# Patient Record
Sex: FEMALE | Race: WHITE
[De-identification: names, ages, dates, MRNs, and addresses within clinical notes are randomized per-mention and may not be internally consistent; named-entity substitution may affect disease eponyms.]

---

## 2017-02-14 ENCOUNTER — HOSPITAL ENCOUNTER (EMERGENCY)
Dept: HOSPITAL 59 - ER | Age: 77
Discharge: HOME | End: 2017-02-14
Payer: MEDICARE

## 2017-02-14 DIAGNOSIS — Z87.891: ICD-10-CM

## 2017-02-14 DIAGNOSIS — I10: Primary | ICD-10-CM

## 2017-02-14 DIAGNOSIS — R42: ICD-10-CM

## 2017-02-14 DIAGNOSIS — I25.2: ICD-10-CM

## 2017-02-14 LAB
ANION GAP SERPL CALC-SCNC: 13.5 MMOL/L (ref 7–16)
BASOPHILS NFR BLD: 0.7 % (ref 0–6)
BUN SERPL-MCNC: 18 MG/DL (ref 7–17)
CO2 SERPL-SCNC: 25.5 MMOL/L (ref 22–30)
CREAT SERPL-MCNC: 0.7 MG/DL (ref 0.52–1.04)
EOSINOPHIL NFR BLD: 2.8 % (ref 0–6)
ERYTHROCYTE [DISTWIDTH] IN BLOOD BY AUTOMATED COUNT: 12.3 % (ref 11.5–14.5)
EST GLOMERULAR FILTRATION RATE: > 60 ML/MIN
GLUCOSE SERPL-MCNC: 96 MG/DL (ref 70–110)
GRANULOCYTES NFR BLD: 55.6 % (ref 47–80)
HCT VFR BLD CALC: 40.9 % (ref 35–47)
HGB BLD-MCNC: 14.2 GM/DL (ref 11.6–16)
LYMPHOCYTES NFR BLD AUTO: 31.2 % (ref 16–45)
MCH RBC QN AUTO: 31.8 PG (ref 27–33)
MCHC RBC AUTO-ENTMCNC: 34.7 G/DL (ref 32–36)
MCV RBC AUTO: 91.7 FL (ref 81–97)
MONOCYTES NFR BLD: 9.7 % (ref 0–9)
PLATELET # BLD: 255 K/UL (ref 130–400)
PMV BLD AUTO: 9.8 FL (ref 7.4–10.4)
RBC # BLD AUTO: 4.46 M/UL (ref 3.8–5.4)
WBC # BLD AUTO: 7.1 K/UL (ref 4.2–12.2)

## 2017-02-14 PROCEDURE — 80048 BASIC METABOLIC PNL TOTAL CA: CPT

## 2017-02-14 PROCEDURE — 99284 EMERGENCY DEPT VISIT MOD MDM: CPT

## 2017-02-14 PROCEDURE — 84484 ASSAY OF TROPONIN QUANT: CPT

## 2017-02-14 PROCEDURE — 93010 ELECTROCARDIOGRAM REPORT: CPT

## 2017-02-14 PROCEDURE — 85025 COMPLETE CBC W/AUTO DIFF WBC: CPT

## 2017-02-14 PROCEDURE — 93005 ELECTROCARDIOGRAM TRACING: CPT

## 2017-02-14 NOTE — EMERGENCY DEPARTMENT RECORD
History of Present Illness





- General


Chief Complaint: Hypertension


Stated Complaint: HYPERTENTION


Time Seen by Provider: 17 18:15


Source: Patient


Mode of Arrival: Ambulatory


Limitations: No limitations





- History of Present Illness


Initial Comments: 


pt was home when a friend checked her bp and found it to be 155/105.  they 

became alarmed at this high presure, they tell me, and called her drs office.  

her doctors office told her to go to the er.  she said also she then became 

light headed and thought she was going to pass out. she said she has been in 

chronic pain for a year. she was also concerned that her hr was 61.


MD Complaint: Dizziness, Lightheadedness


Onset/Timin


-: Hour(s)


Timing: Gradual onset


Description: Lightheadedness


History of Same: Yes


History of Trauma: No


Improves With: Nothing





- Henny Coma Scale


Eye Response: (4) Open spontaneously


Motor Response: (6) Obeys commands


Verbal Response: (5) Oriented


[place] Total: 15





- Symptoms of Stroke


Onset of Symptoms Date: 17


Onset of Symptoms Time: 10:00





- Related Data


 Home Medications











 Medication  Instructions  Recorded  Confirmed  Last Taken


 


Aspirin [Aspirin EC] 81 mg PO DAILY 11/10/14 02/14/17 1 Day Ago


 


Metoprolol/Hydrochlorothiazide 1 each PO DAILY 11/10/14 02/14/17 1 Day Ago





[Metoprolol-Hctz 100-50 mg Tab]    


 


Multivitamin [Multi-Vitamin Daily] 1 tab PO DAILY 11/10/14 02/14/17 1 Day Ago


 


Naproxen [Naprosyn] 500 mg PO BID 16 1 Day Ago


 


Omeprazole [Prilosec] 20 mg PO DAILY 17 1 Day Ago











 Allergies











Allergy/AdvReac Type Severity Reaction Status Date / Time


 


tramadol Allergy Severe ITCHING Verified 17 17:35


 


amoxicillin trihydrate Allergy Unknown PT UNSURE Verified 17 17:35





[From AMOXIL]   OF REACTION  


 


azithromycin [From ZITHROMAX] Allergy Unknown PT UNSURE Verified 17 17:35





   OF REACTION  


 


ciprofloxacin [From CIPRO] Allergy Unknown PT UNSURE Verified 17 17:35





   OF REACTION  


 


ciprofloxacin HCl Allergy Unknown PT UNSURE Verified 17 17:35





[From CIPRO]   OF REACTION  


 


clarithromycin Allergy Unknown PT UNSURE Verified 17 17:35





[CLARITHROMYCIN]   OF REACTION  


 


codeine [CODEINE] Allergy Unknown PT UNSURE Verified 17 17:35





   OF REACTION  


 


prednisone [PREDNISONE] Allergy Unknown PT Verified 17 17:35





   ALLERGIC  





   TO STEROIDS  


 


Sulfa (Sulfonamide Allergy Unknown ANAPHYLAXIS Verified 17 17:35





Antibiotics)     














Travel Screening





- Travel/Exposure Within Last 30 Days


Have you traveled within the last 30 days?: No





- Travel/Exposure Within Last Year


Have you traveled outside the [place] in the last year?: No





- Additonal Travel Details


Have you been exposed to anyone with a communicable illness?: No





- Travel Symptoms


Symptom Screening: None





Review of Systems


Reviewed: No additional complaints except as noted below


Constitutional: Reports: As per HPI.  Denies: Chills, Fever, Malaise, Night 

sweats, Weakness, Weight change


Eyes: Reports: As per HPI.  Denies: Eye discharge, Eye pain, Photophobia, 

Vision change


ENT: Reports: As per HPI.  Denies: Congestion, Dental pain, Ear pain, Epistaxis

, Hearing loss, Throat pain


Respiratory: Reports: As per HPI.  Denies: Cough, Dyspnea, Hemoptysis, Stridor, 

Wheezes


Cardiovascular: Reports: As per HPI.  Denies: Arrhythmia, Chest pain, Dyspnea 

on exertion, Edema, Murmurs, Orthopnea, Palpitations, Paroxysmal nocturnal 

dyspnea, Rheumatic Fever, Syncope


Endocrine: Reports: As per HPI.  Denies: Fatigue, Heat or cold intolerance, 

Polydipsia, Polyuria


Gastrointestinal: Reports: As per HPI.  Denies: Abdominal pain, Constipation, 

Diarrhea, Hematemesis, Hematochezia, Melena, Nausea, Vomiting


Genitourinary: Reports: As per HPI.  Denies: Abnormal menses, Discharge, 

Dyspareunia, Dysuria, Frequency, Hematuria, Incontinence, Retention, Urgency


Musculoskeletal: Reports: As per HPI.  Denies: Arthralgia, Back pain, Gout, 

Joint swelling, Myalgia, Neck pain


Skin: Reports: As per HPI.  Denies: Bruising, Change in color, Change in hair/

nails, Lesions, Pruritus, Rash


Neurological: Reports: As per HPI.  Denies: Abnormal gait, Confusion, Headache, 

Numbness, Paresthesias, Seizure, Tingling, Tremors, Vertigo, Weakness


Psychiatric: Reports: As per HPI.  Denies: Anxiety, Auditory hallucinations, 

Depression, Homicidal thoughts, Suicidal thoughts, Visual hallucinations


Hematological/Lymphatic: Reports: As per HPI.  Denies: Anemia, Blood Clots, 

Easy bleeding, Easy bruising, Swollen glands





Past Medical History





- SOCIAL HISTORY


Smoking Status: Former smoker


Alcohol Use: None


Drug Use: None





- RESPIRATORY


Hx Respiratory Disorders: Yes


Hx Pneumonia: Yes





- CARDIOVASCULAR


Hx Cardio Disorders: Yes


Hx Heart Attack: Yes


Hx Hypertension: Yes





- NEURO


Hx Neuro Disorders: Yes


Hx CVA: Yes


Hx TIA: Yes





- GI


Hx GI Disorders: Yes


Hx Reflux: Yes (was on nexium)





- 


Hx Genitourinary Disorders: No





- ENDOCRINE


Hx Endocrine Disorders: No


Hx Diabetes: No


Hx Thyroid Disease: No





- MUSCULOSKELETAL


Hx Musculoskeletal Disorders: Yes


Hx Arthritis: Yes


Hx Osteoporosis: Yes





- PSYCH


Hx Psych Problems: No





- HEMATOLOGY/ONCOLOGY


Hx Hematology/Oncology Disorders: Yes


Hx Cancer: Yes (uterine)





Family Medical History


Any Significant Family History?: Yes


Hx Alcohol Use: Father


Hx Cancer: Brother/Sister


Hx Diabetes: Brother/Sister


Hx Heart Disease: Father, Brother/Sister


Hx Liver Disease: Father


Hx Resp Disorders: Father, Mother, Brother/Sister





Physical Exam





- General


General Appearance: Alert, Oriented x3, Cooperative, Mild distress





- Head


Head exam: Normal inspection





- Eye


Eye exam: Normal appearance, PERRL, EOMI


Pupils: Normal accommodation





- ENT


ENT exam: Normal exam, Mucous membranes moist, Normal external ear exam, Normal 

orophraynx


Ear exam: Normal external inspection.  negative: External canal tenderness


Nasal Exam: Normal inspection.  negative: Discharge, Sinus tenderness


Mouth exam: Normal external inspection, Tongue normal


Teeth exam: Normal inspection.  negative: Dental caries


Throat exam: Normal inspection.  negative: Tonsillar erythema, Tonsillar exudate





- Neck


Neck exam: Normal inspection, Full ROM.  negative: Tenderness





- Respiratory


Respiratory exam: Normal lung sounds bilaterally.  negative: Respiratory 

distress





- Cardiovascular


Cardiovascular Exam: Regular rate, Normal rhythm, Normal heart sounds





- GI/Abdominal


GI/Abdominal exam: Soft, Normal bowel sounds.  negative: Tenderness





- Rectal


Rectal exam: Deferred





- 


 exam: Deferred





- Extremities


Extremities exam: Normal inspection, Full ROM, Normal capillary refill.  

negative: Tenderness





- Back


Back exam: Reports: Normal inspection, Full ROM.  Denies: Muscle spasm, Rash 

noted, Tenderness





- Neurological


Neurological exam: Alert, CN II-XII intact, Normal gait, Oriented X3





- Psychiatric


Psychiatric exam: Normal affect, Normal mood





- Skin


Skin exam: Dry, Intact, Normal color, Warm





Course





 Vital Signs











  17





  17:27


 


Temperature 98.3 F


 


Pulse Rate 60


 


Respiratory 18





Rate 


 


Blood Pressure 167/95


 


Pulse Ox 16 L














Medical Decision Making





- Management Options


MDM Management: Additional Work-up Planned (e.g. ADM/Transfer/OP Study)





- Data Complexity


MDM Data: Labs Ordered and/or Reviewed, X-Ray Ordered and/or Reviewed, EKG 

Ordered and/or Reviewed





- Lab Data


Result diagrams: 


 17 17:50





 17 17:50





 Lab Results











  17 Range/Units





  17:50 17:50 


 


WBC  7.1   (4.2-12.2)  K/uL


 


RBC  4.46   (3.80-5.40)  M/uL


 


Hgb  14.2   (11.6-16.0)  gm/dl


 


Hct  40.9   (35.0-47.0)  %


 


MCV  91.7   (81-97)  fl


 


MCH  31.8   (27-33)  pg


 


MCHC  34.7   (32-36)  g/dl


 


RDW  12.3   (11.5-14.5)  %


 


Plt Count  255   (130-400)  K/uL


 


MPV  9.8   (7.4-10.4)  fl


 


Gran %  55.6   (47-80)  %


 


Lymphocytes %  31.2   (16-45)  %


 


Monocytes %  9.7 H   (0-9)  %


 


Eosinophils %  2.8   (0-6)  %


 


Basophils %  0.7   (0-6)  %


 


Sodium   140  (136-145)  mmol/L


 


Potassium   3.9  (3.5-5.1)  mmol/L


 


Chloride   101  ()  mmol/L


 


Carbon Dioxide   25.5  (22-30)  mmol/L


 


Anion Gap   13.5  (7-16)  


 


BUN   18 H  (7-17)  mg/dL


 


Creatinine   0.7  (0.52-1.04)  mg/dL


 


Estimated GFR   > 60  ml/min


 


Random Glucose   96  ()  mg/dL


 


Calcium   8.9  (8.5-10.1)  mg/dL














- EKG Data


-: EKG Interpreted by Me


EKG: Abnormal EKG (nonspecific t wave inversions)





- Radiology Data


Radiology results: Report reviewed, Image reviewed





Disposition


Disposition: Discharge


Clinical Impression: 


Hypertension


Qualifiers:


 Hypertension type: essential hypertension Qualified Code(s): I10 - Essential (

primary) hypertension


Disposition: Home, Self-Care


Instructions:  Hypertension (ED)


Additional Instructions: 


recheck blood pressure tomorrow. return sooner if worse.  follow up with family 

doctor.  push fluids


Forms:  Patient Portal Access

## 2017-05-09 ENCOUNTER — HOSPITAL ENCOUNTER (EMERGENCY)
Dept: HOSPITAL 59 - ER | Age: 77
Discharge: HOME | End: 2017-05-09
Payer: MEDICARE

## 2017-05-09 DIAGNOSIS — F17.210: ICD-10-CM

## 2017-05-09 DIAGNOSIS — I25.2: ICD-10-CM

## 2017-05-09 DIAGNOSIS — R11.2: ICD-10-CM

## 2017-05-09 DIAGNOSIS — R19.7: Primary | ICD-10-CM

## 2017-05-09 DIAGNOSIS — R10.13: ICD-10-CM

## 2017-05-09 DIAGNOSIS — I10: ICD-10-CM

## 2017-05-09 LAB
ALBUMIN SERPL-MCNC: 4.3 GM/DL (ref 3.5–5)
ALBUMIN/GLOB SERPL: 1.3 {RATIO} (ref 1.1–1.8)
ALP SERPL-CCNC: 100 U/L (ref 38–126)
ALT SERPL-CCNC: 38 U/L (ref 9–52)
ANION GAP SERPL CALC-SCNC: 11 MMOL/L (ref 7–16)
APPEARANCE UR: CLEAR
AST SERPL-CCNC: 41 U/L (ref 14–36)
BACTERIA #/AREA URNS HPF: (no result) /[HPF]
BASOPHILS NFR BLD: 0.4 % (ref 0–6)
BILIRUB SERPL-MCNC: 0.48 MG/DL (ref 0.2–1.3)
BILIRUB UR-MCNC: NEGATIVE MG/DL
BUN SERPL-MCNC: 13 MG/DL (ref 7–17)
CO2 SERPL-SCNC: 25 MMOL/L (ref 22–30)
COLOR UR: YELLOW
CREAT SERPL-MCNC: 0.6 MG/DL (ref 0.52–1.04)
EOSINOPHIL NFR BLD: 4.1 % (ref 0–6)
ERYTHROCYTE [DISTWIDTH] IN BLOOD BY AUTOMATED COUNT: 12.4 % (ref 11.5–14.5)
EST GLOMERULAR FILTRATION RATE: > 60 ML/MIN
GLOBULIN SER-MCNC: 3.2 GM/DL (ref 1.4–4.8)
GLUCOSE SERPL-MCNC: 108 MG/DL (ref 70–110)
GLUCOSE UR STRIP-MCNC: NEGATIVE MG/DL
GRANULOCYTES NFR BLD: 48.7 % (ref 47–80)
HCT VFR BLD CALC: 42.5 % (ref 35–47)
HGB BLD-MCNC: 14.7 GM/DL (ref 11.6–16)
KETONES UR QL STRIP: NEGATIVE
LIPASE SERPL-CCNC: 29 U/L (ref 23–300)
LYMPHOCYTES NFR BLD AUTO: 34 % (ref 16–45)
MCH RBC QN AUTO: 31.7 PG (ref 27–33)
MCHC RBC AUTO-ENTMCNC: 34.6 G/DL (ref 32–36)
MCV RBC AUTO: 91.8 FL (ref 81–97)
MOLECULAR C DIFF TOXIN SCREEN: NOT DETECTED
MONOCYTES NFR BLD: 12.8 % (ref 0–9)
NITRITE UR QL STRIP: NEGATIVE
PLATELET # BLD: 256 K/UL (ref 130–400)
PMV BLD AUTO: 9.7 FL (ref 7.4–10.4)
PROT SERPL-MCNC: 7.5 GM/DL (ref 6.3–8.2)
PROT UR QL STRIP: NEGATIVE
RBC # BLD AUTO: 4.63 M/UL (ref 3.8–5.4)
RBC # UR STRIP: (no result) /UL
ROTOVIRUS: NOT DETECTED
URINE LEUKOCYTE ESTERASE: (no result)
UROBILINOGEN UR STRIP-ACNC: 0.2 E.U./DL (ref 0.2–1)
WBC # BLD AUTO: 5.1 K/UL (ref 4.2–12.2)

## 2017-05-09 PROCEDURE — 99284 EMERGENCY DEPT VISIT MOD MDM: CPT

## 2017-05-09 PROCEDURE — 85025 COMPLETE CBC W/AUTO DIFF WBC: CPT

## 2017-05-09 PROCEDURE — 81001 URINALYSIS AUTO W/SCOPE: CPT

## 2017-05-09 PROCEDURE — 87493 C DIFF AMPLIFIED PROBE: CPT

## 2017-05-09 PROCEDURE — 87425 ROTAVIRUS AG IA: CPT

## 2017-05-09 PROCEDURE — 83690 ASSAY OF LIPASE: CPT

## 2017-05-09 PROCEDURE — 96374 THER/PROPH/DIAG INJ IV PUSH: CPT

## 2017-05-09 PROCEDURE — 80053 COMPREHEN METABOLIC PANEL: CPT

## 2017-05-09 NOTE — EMERGENCY DEPARTMENT RECORD
History of Present Illness





- General


Stated complaint: DIARRHEA/NAUSEATED


Time Seen by Provider: 17 18:30


Source: Patient


Mode of Arrival: Ambulatory


Limitations: No limitations





- History of Present Illness


Initial comments: 





78 yo female presents to ED with a CC of nausea, abdominal pain symptoms, and 

loose stools for nearly 1 week.  Patient reports that she started Zithromax 

just prior to her symptoms beginning.  Patient denies fevers, chills, vomiting, 

or urinary symptoms.  Patient denies health problems other than HTN and 

previous MI, denies the use of anticoagulation medications.  


MD complaint: Diarrhea


Onset/Timin


-: Days(s)


Description of Vomiting: Watery


Associated Abdominal Pain: Yes


Location: Epigastric


Radiation: None


Severity: Moderate


Consistency: Intermittent


Improves with: None


Worsens with: Eating


Context: Recent anitbiotic use


Associated Symptoms: Nausea/vomiting





- Related Data


 Home Medications











 Medication  Instructions  Recorded  Confirmed  Last Taken


 


Aspirin [Aspirin EC] 81 mg PO DAILY 11/10/14 05/09/17 1 Day Ago





    ~17


 


Multivitamin [Multi-Vitamin Daily] 1 tab PO DAILY 11/10/14 05/09/17 1 Day Ago





    ~17


 


Naproxen [Naprosyn] 500 mg PO BID 16 1 Day Ago





    ~17


 


Omeprazole [Prilosec] 20 mg PO DAILY 17 1 Day Ago





    ~17


 


Azithromycin [Zithromax] 250 mg PO DAILY 17 1 Day Ago





    ~17


 


Lisinopril [Lisinopril] 20 mg PO DAILY 17 1 Day Ago





    ~17


 


Metoprolol Tartrate 100 mg PO DAILY 17 1 Day Ago





    ~17








 Previous Rx's











 Medication  Instructions  Recorded


 


Loperamide HCl [Immodium] 2 mg PO Q4H #15 capsule 17


 


Ondansetron [Zofran Odt] 4 mg PO Q6H PRN #20 tab.rapdis 17











 Allergies











Allergy/AdvReac Type Severity Reaction Status Date / Time


 


tramadol Allergy Severe ITCHING Verified 17 10:12


 


amoxicillin trihydrate Allergy Unknown PT UNSURE Verified 17 10:12





[From AMOXIL]   OF REACTION  


 


azithromycin [From ZITHROMAX] Allergy Unknown PT UNSURE Verified 17 10:12





   OF REACTION  


 


ciprofloxacin [From CIPRO] Allergy Unknown PT UNSURE Verified 17 10:12





   OF REACTION  


 


ciprofloxacin HCl Allergy Unknown PT UNSURE Verified 17 10:12





[From CIPRO]   OF REACTION  


 


clarithromycin Allergy Unknown PT UNSURE Verified 17 10:12





[CLARITHROMYCIN]   OF REACTION  


 


codeine [CODEINE] Allergy Unknown PT UNSURE Verified 17 10:12





   OF REACTION  


 


prednisone [PREDNISONE] Allergy Unknown PT Verified 17 10:12





   ALLERGIC  





   TO STEROIDS  


 


Sulfa (Sulfonamide Allergy Unknown ANAPHYLAXIS Verified 17 10:12





Antibiotics)     














Review of Systems


Constitutional: Denies: Chills, Fever, Malaise, Night sweats


Eyes: Denies: Eye discharge, Eye pain


ENT: Denies: Congestion, Ear pain, Epistaxis


Respiratory: Denies: Cough, Stridor, Wheezes


Cardiovascular: Denies: Chest pain, Dyspnea on exertion, Paroxysmal nocturnal 

dyspnea


Endocrine: Denies: Fatigue, Heat or cold intolerance


Gastrointestinal: Reports: Abdominal pain, Diarrhea, Nausea.  Denies: 

Constipation, Vomiting


Genitourinary: Denies: Frequency, Hematuria, Incontinence, Retention


Musculoskeletal: Denies: Arthralgia, Back pain, Gout, Joint swelling


Skin: Denies: Bruising, Change in color


Neurological: Denies: Abnormal gait, Confusion, Headache, Seizure


Psychiatric: Denies: Anxiety


Hematological/Lymphatic: Denies: Anemia, Blood Clots





Past Medical History





- SOCIAL HISTORY


Smoking Status: Former smoker


Drug Use: None





- RESPIRATORY


Hx Respiratory Disorders: Yes


Hx Pneumonia: Yes





- CARDIOVASCULAR


Hx Cardio Disorders: Yes


Hx Heart Attack: Yes


Hx Hypertension: Yes





- NEURO


Hx Neuro Disorders: Yes


Hx CVA: Yes


Hx Headaches: Yes


Hx TIA: Yes





- GI


Hx GI Disorders: Yes


Hx Reflux: Yes (was on nexium)





- 


Hx Genitourinary Disorders: No





- ENDOCRINE


Hx Endocrine Disorders: No


Hx Diabetes: No


Hx Thyroid Disease: No





- MUSCULOSKELETAL


Hx Musculoskeletal Disorders: Yes


Hx Arthritis: Yes


Hx Osteoporosis: Yes





- PSYCH


Hx Psych Problems: No





- HEMATOLOGY/ONCOLOGY


Hx Hematology/Oncology Disorders: Yes


Hx Cancer: Yes (uterine)





Family Medical History


Hx Alcohol Use: Father


Hx Cancer: Brother/Sister


Hx Diabetes: Brother/Sister


Hx Heart Disease: Father, Brother/Sister


Hx Liver Disease: Father


Hx Resp Disorders: Father, Mother, Brother/Sister





Physical Exam





- General


General Appearance: Alert, Oriented x3, Cooperative, Mild distress


Limitations: No limitations





- Head


Head exam: Atraumatic, Normocephalic, Normal inspection


Head exam detail: negative: Abrasion, Contusion, Richard's sign, General 

tenderness, Hematoma, Laceration





- Eye


Eye exam: Normal appearance.  negative: Conjunctival injection, Periorbital 

swelling, Periorbital tenderness, Scleral icterus





- ENT


Ear exam: negative: Auricular hematoma, Auricular trauma


Nasal Exam: negative: Active bleeding, Discharge, Dried blood, Foreign body


Mouth exam: negative: Drooling, Laceration, Muffled voice, Tongue elevation





- Neck


Neck exam: Normal inspection.  negative: Meningismus, Tenderness





- Respiratory


Respiratory exam: Normal lung sounds bilaterally.  negative: Respiratory 

distress, Rhonchi, Stridor, Wheezes





- Cardiovascular


Cardiovascular Exam: Regular rate, Normal rhythm, Normal heart sounds





- GI/Abdominal


GI/Abdominal exam: Soft, Tenderness (Very mild TTP epigastric region, no rebound

, guarding, or peritoneal signs on exam).  negative: Organomegaly, Pulsatile 

mass, Rebound, Rigid





- Rectal


Rectal exam: Deferred





- 


 exam: Deferred





- Extremities


Extremities exam: Normal inspection.  negative: Calf tenderness, Pedal edema, 

Tenderness





- Back


Back exam: Denies: CVA tenderness (R), CVA tenderness (L)





- Neurological


Neurological exam: Alert, Normal gait, Oriented X3





- Psychiatric


Psychiatric exam: Normal affect, Normal mood





- Skin


Skin exam: Normal color.  negative: Abrasion


Type of lesion: negative: abrasion





Course





- Reevaluation(s)


Reevaluation #1: 





17 20:16


Labs reviewed and are grossly unremarkable for an acute process.  Patient 

reassessed, has given stool sample, and denies any abdominal pain at this time.

  Nausea is reported to be improved.  No CT imaging is felt to be necessary at 

this time.  Will continue to monitor pending C. Diff/Roto results.


Reevaluation #2: 





17 21:16


C. Diff and Rotovirus are reported as negative.





Patient was updated on all results, reports that she is feeling much better, 

and appears stable for discharge at this time with a return for any worsening 

of her symptoms.





Medical Decision Making





- Lab Data


Result diagrams: 


 17 18:55





 17 18:55





Disposition


Disposition: Discharge


Clinical Impression: 


Diarrhea


Qualifiers:


 Diarrhea type: unspecified type Qualified Code(s): R19.7 - Diarrhea, 

unspecified





Disposition: Home, Self-Care


Condition: (2) Stable


Instructions:  Acute Diarrhea (ED)


Additional Instructions: 


Return to ED if your symptoms worsen or if you have any concerns.


Immodium and Zofran as directed.


Follow-up with your family doctor in 1-3 days as directed.


Prescriptions: 


Loperamide HCl [Immodium] 2 mg PO Q4H #15 capsule


Ondansetron [Zofran Odt] 4 mg PO Q6H PRN #20 tab.rapdis


 PRN Reason: Nausea/Vomiting


Time of Disposition: 21:21

## 2017-09-12 ENCOUNTER — HOSPITAL ENCOUNTER (EMERGENCY)
Dept: HOSPITAL 59 - ER | Age: 77
Discharge: HOME | End: 2017-09-12
Payer: MEDICARE

## 2017-09-12 DIAGNOSIS — R07.89: ICD-10-CM

## 2017-09-12 DIAGNOSIS — R11.0: ICD-10-CM

## 2017-09-12 DIAGNOSIS — N30.00: ICD-10-CM

## 2017-09-12 DIAGNOSIS — R53.1: ICD-10-CM

## 2017-09-12 DIAGNOSIS — Z87.891: ICD-10-CM

## 2017-09-12 DIAGNOSIS — I25.2: ICD-10-CM

## 2017-09-12 DIAGNOSIS — R10.31: Primary | ICD-10-CM

## 2017-09-12 DIAGNOSIS — I10: ICD-10-CM

## 2017-09-12 LAB
ALBUMIN SERPL-MCNC: 3.9 G/DL (ref 4–5)
ALBUMIN/GLOB SERPL: 1.3 {RATIO} (ref 1.1–1.8)
ALP SERPL-CCNC: 90 U/L (ref 35–104)
ALT SERPL-CCNC: 17 U/L (ref ?–33)
ANION GAP SERPL CALC-SCNC: 12 MMOL/L (ref 7–16)
APPEARANCE UR: (no result)
AST SERPL-CCNC: 18 U/L (ref 10–35)
BACTERIA #/AREA URNS HPF: (no result) /[HPF]
BASOPHILS NFR BLD: 0.4 % (ref 0–6)
BILIRUB SERPL-MCNC: 0.4 MG/DL (ref 0.2–1)
BILIRUB UR-MCNC: NEGATIVE MG/DL
BUN SERPL-MCNC: 29.7 MG/DL (ref 8–23)
CO2 SERPL-SCNC: 27 MMOL/L (ref 22–29)
COLOR UR: YELLOW
CREAT SERPL-MCNC: 0.6 MG/DL (ref 0.5–0.9)
EOSINOPHIL NFR BLD: 1.5 % (ref 0–6)
EPI CELLS #/AREA URNS HPF: (no result) /[HPF]
ERYTHROCYTE [DISTWIDTH] IN BLOOD BY AUTOMATED COUNT: 12 % (ref 11.5–14.5)
EST GLOMERULAR FILTRATION RATE: > 60 ML/MIN
GLOBULIN SER-MCNC: 3 GM/DL (ref 1.4–4.8)
GLUCOSE SERPL-MCNC: 114 MG/DL (ref 74–109)
GLUCOSE UR STRIP-MCNC: NEGATIVE MG/DL
GRANULOCYTES NFR BLD: 77.9 % (ref 47–80)
HCT VFR BLD CALC: 36 % (ref 35–47)
HGB BLD-MCNC: 13 GM/DL (ref 11.6–16)
KETONES UR QL STRIP: NEGATIVE
LIPASE SERPL-CCNC: 15 U/L (ref 13–60)
LYMPHOCYTES NFR BLD AUTO: 11.9 % (ref 16–45)
MCH RBC QN AUTO: 32.7 PG (ref 27–33)
MCHC RBC AUTO-ENTMCNC: 36.1 G/DL (ref 32–36)
MCV RBC AUTO: 90.7 FL (ref 81–97)
MONOCYTES NFR BLD: 8.3 % (ref 0–9)
NITRITE UR QL STRIP: NEGATIVE
PLATELET # BLD: 259 K/UL (ref 130–400)
PMV BLD AUTO: 9.2 FL (ref 7.4–10.4)
PROT SERPL-MCNC: 6.9 G/DL (ref 6.6–8.7)
PROT UR QL STRIP: NEGATIVE
RBC # BLD AUTO: 3.97 M/UL (ref 3.8–5.4)
RBC # UR STRIP: (no result) /UL
TROPONIN I SERPL-MCNC: < 0.01 NG/ML (ref 0–0.3)
URINE LEUKOCYTE ESTERASE: (no result)
UROBILINOGEN UR STRIP-ACNC: 0.2 E.U./DL (ref 0.2–1)
WBC # BLD AUTO: 10.6 K/UL (ref 4.2–12.2)
WBC #/AREA URNS HPF: >50 /[HPF]

## 2017-09-12 PROCEDURE — 93005 ELECTROCARDIOGRAM TRACING: CPT

## 2017-09-12 PROCEDURE — 84484 ASSAY OF TROPONIN QUANT: CPT

## 2017-09-12 PROCEDURE — 85025 COMPLETE CBC W/AUTO DIFF WBC: CPT

## 2017-09-12 PROCEDURE — 93010 ELECTROCARDIOGRAM REPORT: CPT

## 2017-09-12 PROCEDURE — 99284 EMERGENCY DEPT VISIT MOD MDM: CPT

## 2017-09-12 PROCEDURE — 80053 COMPREHEN METABOLIC PANEL: CPT

## 2017-09-12 PROCEDURE — 81001 URINALYSIS AUTO W/SCOPE: CPT

## 2017-09-12 PROCEDURE — 83690 ASSAY OF LIPASE: CPT

## 2017-09-12 NOTE — EMERGENCY DEPARTMENT RECORD
History of Present Illness





- General


Stated Complaint: WEAK


Time Seen by Provider: 09/12/17 10:50


Source: Patient


Mode of Arrival: Ambulatory


Limitations: No limitations





- History of Present Illness


Initial Comments: 





76 yo female presents with several recent concerns.  This morning she ate 

breakfast that included oatmeal and toast.  While eating she developed an 

abdominal pain that she states was a tightness across the upper abdomen.  She 

felt weak and nauseated.  She sat on the toilet and had a bowel movement.  This 

relieved some of the discomfort.  She noted that her bilateral palms were red 

and warm.  She did not vomiting.  She has been having upper abdominal pain for 

several weeks.  She reports a fullness feeling in the upper abdomen.  She had 

CT scan with her PCP last Friday.  She also notes chest pain on a few occasions 

over the last 4 months.  The pain is brief never lasting long than a minute.  

It occurs at rest and not with activity or exertion.  She did have a heart 

attach in her early 50's.  She does not see a cardiologist. Her last CP was 

yesterday again non exertioal and lasting less that a minute.


MD Complaint: Abdominal pain


-: Hour(s)


Location: Epigastric


Radiation: Epigastric


Migration to: Epigastric


Severity: Moderate


Quality: Fullness





- Related Data


 Home Medications











 Medication  Instructions  Recorded  Confirmed  Last Taken


 


Ibuprofen 800 mg PO TID 09/12/17 09/12/17 09/11/17


 


Lisinopril/Hydrochlorothiazide 1 each PO DAILY 09/12/17 09/12/17 09/12/17





[Lisinopril-Hctz 20-12.5 mg Tab]    








 Previous Rx's











 Medication  Instructions  Recorded


 


Nitrofurantoin Monohyd/M-Cryst 100 mg PO BID #14 capsule 09/12/17





[Macrobid 100 mg Capsule]  


 


Pantoprazole Sodium [Protonix] 40 mg PO DAILY #30 tablet. 09/12/17











 Allergies











Allergy/AdvReac Type Severity Reaction Status Date / Time


 


tramadol Allergy Severe ITCHING Verified 03/12/17 10:12


 


amoxicillin trihydrate Allergy Unknown PT UNSURE Verified 03/12/17 10:12





[From AMOXIL]   OF REACTION  


 


azithromycin [From ZITHROMAX] Allergy Unknown PT UNSURE Verified 03/12/17 10:12





   OF REACTION  


 


ciprofloxacin [From CIPRO] Allergy Unknown PT UNSURE Verified 03/12/17 10:12





   OF REACTION  


 


ciprofloxacin HCl Allergy Unknown PT UNSURE Verified 03/12/17 10:12





[From CIPRO]   OF REACTION  


 


clarithromycin Allergy Unknown PT UNSURE Verified 03/12/17 10:12





[CLARITHROMYCIN]   OF REACTION  


 


codeine [CODEINE] Allergy Unknown PT UNSURE Verified 03/12/17 10:12





   OF REACTION  


 


prednisone [PREDNISONE] Allergy Unknown PT Verified 03/12/17 10:12





   ALLERGIC  





   TO STEROIDS  


 


Sulfa (Sulfonamide Allergy Unknown ANAPHYLAXIS Verified 03/12/17 10:12





Antibiotics)     














Review of Systems


Constitutional: Reports: Weakness.  Denies: Chills, Fever, Malaise


Eyes: Denies: Eye discharge, Eye pain


ENT: Denies: Congestion, Throat pain


Respiratory: Reports: Dyspnea (chronic and unchanged).  Denies: Cough, 

Hemoptysis, Stridor, Wheezes


Cardiovascular: Reports: As per HPI, Chest pain (a few episodes over the last 4 

weeks, lasting less than one minute, non exertional ), Dyspnea on exertion (

chronic and unchanged).  Denies: Edema, Palpitations, Syncope


Endocrine: Denies: Fatigue, Polydipsia, Polyuria


Gastrointestinal: Reports: As per HPI, Abdominal pain, Nausea.  Denies: Diarrhea

, Hematemesis, Vomiting


Genitourinary: Denies: Dysuria, Urgency


Musculoskeletal: Reports: Arthralgia (chronic), Back pain (chronic).  Denies: 

Myalgia, Neck pain


Skin: Denies: Bruising, Change in color, Rash


Neurological: Denies: Headache, Numbness, Weakness


Psychiatric: Denies: Anxiety


Hematological/Lymphatic: Denies: Blood Clots, Easy bleeding, Easy bruising, 

Swollen glands





Past Medical History





- SOCIAL HISTORY


Smoking Status: Former smoker


Drug Use: None





- RESPIRATORY


Hx Respiratory Disorders: Yes


Hx Pneumonia: Yes





- CARDIOVASCULAR


Hx Cardio Disorders: Yes


Hx Heart Attack: Yes


Hx Hypertension: Yes





- NEURO


Hx Neuro Disorders: Yes


Hx CVA: Yes


Hx Headaches: Yes


Hx TIA: Yes





- GI


Hx GI Disorders: Yes


Hx Reflux: Yes (was on nexium)





- 


Hx Genitourinary Disorders: No





- ENDOCRINE


Hx Endocrine Disorders: No


Hx Diabetes: No


Hx Thyroid Disease: No





- MUSCULOSKELETAL


Hx Musculoskeletal Disorders: Yes


Hx Arthritis: Yes


Hx Osteoporosis: Yes





- PSYCH


Hx Psych Problems: No





- HEMATOLOGY/ONCOLOGY


Hx Hematology/Oncology Disorders: Yes


Hx Cancer: Yes (uterine)





Family Medical History


Hx Alcohol Use: Father


Hx Cancer: Brother/Sister


Hx Diabetes: Brother/Sister


Hx Heart Disease: Father, Brother/Sister


Hx Liver Disease: Father


Hx Resp Disorders: Father, Mother, Brother/Sister





Physical Exam





- General


General Appearance: Alert, Oriented x3, Cooperative, No acute distress


Limitations: No limitations





- Head


Head exam: Normal inspection





- Eye


Eye exam: Normal appearance, PERRL.  negative: Conjunctival injection, 

Periorbital swelling





- ENT


ENT exam: Normal exam, Mucous membranes moist, Normal orophraynx


Ear exam: Normal external inspection


Nasal Exam: Normal inspection


Mouth exam: Normal external inspection





- Neck


Neck exam: Normal inspection, Full ROM.  negative: Tenderness





- Respiratory


Respiratory exam: Normal lung sounds bilaterally.  negative: Chest wall 

tenderness, Respiratory distress, Rhonchi, Stridor





- Cardiovascular


Cardiovascular Exam: Regular rate, Normal rhythm, Normal heart sounds


Peripheral Pulses: 2+: Radial (R), Radial (L)





- GI/Abdominal


GI/Abdominal exam: Soft, Tenderness (tender but soft n the epigastric area, ).  

negative: Diminished bowel sounds, Distended, Guarding, Hernia, Pulsatile mass, 

Rebound, Rigid





- Rectal


Rectal exam: Deferred





- 


 exam: Deferred





- Extremities


Extremities exam: Normal inspection, Full ROM, Normal capillary refill.  

negative: Tenderness





- Back


Back exam: Reports: Normal inspection, Full ROM.  Denies: Muscle spasm, Rash 

noted, Tenderness





- Neurological


Neurological exam: Alert, Normal gait, Oriented X3.  negative: Altered





- Psychiatric


Psychiatric exam: Normal affect, Normal mood





- Skin


Skin exam: Dry, Intact, Normal color, Warm





Course





- Reevaluation(s)


Reevaluation #1: 


EKG 9/12/2017 NSR, rate 62, intervals normal, axis normal, ST Inverted T waves 

V1-3 also noted on th e11/10/14 EKG without changes.


09/12/17 11:09











Reevaluation #2: 


The CT scan from 9/8/17 was resulted.  No acute process of the abdomen, 

thickening of the duodenum peristalsis vs duodenitis, diverticulosis without 

diverticulitis. 


09/12/17 11:47


The UA is CW UTI


She will be provided a dose of antibiotics in the ED


No other acute changes on the labs and CT


I offered a same day cardiology referral for her very atypical chest pain.  She 

declined and will make an appointment when contacted by the specialty clinic.


She will be changed to Protonix and her Motrin will be stopped as this could be 

causing her epigastric pain.  She was referred to GI at [facility].


09/12/17 12:23








Medical Decision Making





- Lab Data


Result diagrams: 


 09/12/17 11:15





 09/12/17 11:15





Disposition


Disposition: Discharge


Clinical Impression: 


 Epigastric pain, Atypical chest pain





Urinary tract infection


Qualifiers:


 Urinary tract infection type: acute cystitis Hematuria presence: without 

hematuria Qualified Code(s): N30.00 - Acute cystitis without hematuria





Disposition: Home, Self-Care


Condition: (1) Good


Instructions:  Gastritis (ED), Urinary Tract Infection in Women (ED)


Additional Instructions: 


Call your doctor today for close follow up


You are being referred to the [facility] for GI and 

Cardiology


Avoid Motrin, Naprosyn and other anti-inflammatory medications


Prescriptions: 


Nitrofurantoin Monohyd/M-Cryst [Macrobid 100 mg Capsule] 100 mg PO BID #14 

capsule


Pantoprazole Sodium [Protonix] 40 mg PO DAILY #30 tablet.dr


Referrals: 


[facility] [Provider Group]


LAINE SOOD M.D. [MEDICAL DOCTOR] - 


MAYCO HU [MEDICAL DOCTOR] - 


Forms:  Patient Portal Access


Time of Disposition: 12:23





Quality





- Quality Measures


Quality Measures: N/A





- Blood Pressure Screening


Does Patient Have Any of the Following: No


Blood Pressure Classification: Pre-Hypertensive BP Reading


Systolic Measurement: 138


Diastolic Measurement: 62


Screening for High Blood Pressure: < Pre-Hypertensive BP, F/U Documented > [

]


Pre-Hypertensive Follow-up Interventions: Referral to alternative/primary care 

provider.

## 2018-01-21 ENCOUNTER — HOSPITAL ENCOUNTER (EMERGENCY)
Dept: HOSPITAL 59 - ER | Age: 78
Discharge: HOME | End: 2018-01-21
Payer: MEDICARE

## 2018-01-21 DIAGNOSIS — I10: ICD-10-CM

## 2018-01-21 DIAGNOSIS — K52.9: Primary | ICD-10-CM

## 2018-01-21 DIAGNOSIS — I25.2: ICD-10-CM

## 2018-01-21 DIAGNOSIS — Z87.891: ICD-10-CM

## 2018-01-21 LAB
ANION GAP SERPL CALC-SCNC: 14 MMOL/L (ref 7–16)
BUN SERPL-MCNC: 15 MG/DL (ref 8–23)
CO2 SERPL-SCNC: 27 MMOL/L (ref 22–29)
CREAT SERPL-MCNC: 0.6 MG/DL (ref 0.5–0.9)
EOSINOPHIL NFR BLD: 1 % (ref 0–6)
ERYTHROCYTE [DISTWIDTH] IN BLOOD BY AUTOMATED COUNT: 12 % (ref 11.5–14.5)
EST GLOMERULAR FILTRATION RATE: > 60 ML/MIN
FLUBV AG SPEC QL IA: NEGATIVE
GLUCOSE SERPL-MCNC: 131 MG/DL (ref 74–109)
HCT VFR BLD CALC: 43.1 % (ref 35–47)
HGB BLD-MCNC: 15.1 GM/DL (ref 11.6–16)
LEAD BLD-MCNC: NEGATIVE UG/DL
LYMPHOCYTES NFR BLD: 3 % (ref 16–45)
MCH RBC QN AUTO: 32.5 PG (ref 27–33)
MCHC RBC AUTO-ENTMCNC: 35 G/DL (ref 32–36)
MCV RBC AUTO: 92.7 FL (ref 81–97)
MONOCYTES NFR BLD: 5 % (ref 0–9)
NEUTROPHILS NFR BLD AUTO: 91 % (ref 47–80)
PLATELET # BLD: 300 K/UL (ref 130–400)
PMV BLD AUTO: 9.3 FL (ref 7.4–10.4)
RBC # BLD AUTO: 4.65 M/UL (ref 3.8–5.4)
WBC # BLD AUTO: 11.6 K/UL (ref 4.2–12.2)

## 2018-01-21 PROCEDURE — 99284 EMERGENCY DEPT VISIT MOD MDM: CPT

## 2018-01-21 PROCEDURE — 96374 THER/PROPH/DIAG INJ IV PUSH: CPT

## 2018-01-21 PROCEDURE — 80048 BASIC METABOLIC PNL TOTAL CA: CPT

## 2018-01-21 PROCEDURE — 85027 COMPLETE CBC AUTOMATED: CPT

## 2018-01-21 PROCEDURE — 87400 INFLUENZA A/B EACH AG IA: CPT

## 2018-01-21 NOTE — EMERGENCY DEPARTMENT RECORD
History of Present Illness





- General


Chief Complaint: Cough


Stated Complaint: DIARRHEA/ABDOMINAL PAIN


Time Seen by Provider: 18 11:13


Mode of Arrival: Ambulatory





- History of Present Illness


Initial Comments: 


diarrhea started at 1 am today and had 8 stools and crampy abdominal pain and 

better after each diarrhea. No vomiting but nausea. patient also has chronic 

right hip pain and uses tylenol for that.





Onset/Timin


-: Days(s)


Severity: Moderate


Severity scale (1-10): 6


Consistency: Constant





- Related Data


 Allergies











Allergy/AdvReac Type Severity Reaction Status Date / Time


 


tramadol Allergy Severe ITCHING Verified 17 10:12


 


amoxicillin trihydrate Allergy Unknown PT UNSURE Verified 17 10:12





[From AMOXIL]   OF REACTION  


 


azithromycin [From ZITHROMAX] Allergy Unknown PT UNSURE Verified 17 10:12





   OF REACTION  


 


ciprofloxacin [From CIPRO] Allergy Unknown PT UNSURE Verified 17 10:12





   OF REACTION  


 


ciprofloxacin HCl Allergy Unknown PT UNSURE Verified 17 10:12





[From CIPRO]   OF REACTION  


 


clarithromycin Allergy Unknown PT UNSURE Verified 17 10:12





[CLARITHROMYCIN]   OF REACTION  


 


codeine [CODEINE] Allergy Unknown PT UNSURE Verified 17 10:12





   OF REACTION  


 


prednisone [PREDNISONE] Allergy Unknown PT Verified 17 10:12





   ALLERGIC  





   TO STEROIDS  


 


Sulfa (Sulfonamide Allergy Unknown ANAPHYLAXIS Verified 17 10:12





Antibiotics)     














Travel Screening





- Travel/Exposure Within Last 30 Days


Have you traveled within the last 30 days?: No





- Travel/Exposure Within Last Year


Have you traveled outside the [place] in the last year?: No





- Additonal Travel Details


Have you been exposed to anyone with a communicable illness?: No





- Travel Symptoms


Symptom Screening: None





Review of Systems


Reviewed: No additional complaints except as noted below


Constitutional: Reports: As per HPI.  Denies: Chills, Fever, Malaise, Night 

sweats, Weakness, Weight change


Eyes: Reports: As per HPI.  Denies: Eye discharge, Eye pain, Photophobia, 

Vision change


ENT: Reports: As per HPI.  Denies: Congestion, Dental pain, Ear pain, Epistaxis

, Hearing loss, Throat pain


Respiratory: Reports: As per HPI.  Denies: Cough, Dyspnea, Hemoptysis, Stridor, 

Wheezes


Cardiovascular: Reports: As per HPI.  Denies: Arrhythmia, Chest pain, Dyspnea 

on exertion, Edema, Murmurs, Orthopnea, Palpitations, Paroxysmal nocturnal 

dyspnea, Rheumatic Fever, Syncope


Endocrine: Reports: As per HPI.  Denies: Fatigue, Heat or cold intolerance, 

Polydipsia, Polyuria


Gastrointestinal: Reports: As per HPI, Abdominal pain, Diarrhea, Nausea.  Denies

: Constipation, Hematemesis, Hematochezia, Melena, Vomiting


Genitourinary: Reports: As per HPI.  Denies: Abnormal menses, Discharge, 

Dyspareunia, Dysuria, Frequency, Hematuria, Incontinence, Retention, Urgency


Musculoskeletal: Reports: As per HPI.  Denies: Arthralgia, Back pain, Gout, 

Joint swelling, Myalgia, Neck pain


Skin: Reports: As per HPI.  Denies: Bruising, Change in color, Change in hair/

nails, Lesions, Pruritus, Rash


Neurological: Reports: As per HPI.  Denies: Abnormal gait, Confusion, Headache, 

Numbness, Paresthesias, Seizure, Tingling, Tremors, Vertigo, Weakness


Psychiatric: Reports: As per HPI.  Denies: Anxiety, Auditory hallucinations, 

Depression, Homicidal thoughts, Suicidal thoughts, Visual hallucinations


Hematological/Lymphatic: Reports: As per HPI.  Denies: Anemia, Blood Clots, 

Easy bleeding, Easy bruising, Swollen glands





Past Medical History





- SOCIAL HISTORY


Smoking Status: Former smoker


Alcohol Use: None


Drug Use: None





- RESPIRATORY


Hx Respiratory Disorders: Yes


Hx Pneumonia: Yes





- CARDIOVASCULAR


Hx Cardio Disorders: Yes


Hx Heart Attack: Yes


Hx Hypertension: Yes





- NEURO


Hx Neuro Disorders: Yes


Hx CVA: Yes


Hx Headaches: Yes


Hx TIA: Yes





- GI


Hx GI Disorders: Yes


Hx Reflux: Yes (was on nexium)





- 


Hx Genitourinary Disorders: No





- ENDOCRINE


Hx Endocrine Disorders: No


Hx Diabetes: No


Hx Thyroid Disease: No





- MUSCULOSKELETAL


Hx Musculoskeletal Disorders: Yes


Hx Arthritis: Yes


Hx Osteoporosis: Yes





- PSYCH


Hx Psych Problems: No





- HEMATOLOGY/ONCOLOGY


Hx Hematology/Oncology Disorders: Yes


Hx Cancer: Yes (uterine)





Family Medical History


Any Significant Family History?: Yes


Hx Alcohol Use: Father


Hx Cancer: Brother/Sister


Hx Diabetes: Brother/Sister


Hx Heart Disease: Father, Brother/Sister


Hx Liver Disease: Father


Hx Resp Disorders: Father, Mother, Brother/Sister





Physical Exam





- General


General Appearance: Alert, Oriented x3, Cooperative, No acute distress





- Head


Head exam: Normal inspection





- Eye


Eye exam: Normal appearance, PERRL


Pupils: Normal accommodation





- ENT


ENT exam: Normal exam, Mucous membranes moist, Normal external ear exam, Normal 

orophraynx, TM's normal bilaterally


Ear exam: Normal external inspection.  negative: External canal tenderness


Nasal Exam: Normal inspection.  negative: Discharge, Sinus tenderness


Mouth exam: Normal external inspection, Tongue normal


Teeth exam: Normal inspection.  negative: Dental caries


Throat exam: Normal inspection.  negative: Tonsillar erythema, Tonsillar exudate





- Neck


Neck exam: Normal inspection, Full ROM.  negative: Tenderness





- Respiratory


Respiratory exam: Normal lung sounds bilaterally.  negative: Respiratory 

distress





- Cardiovascular


Cardiovascular Exam: Regular rate, Normal rhythm, Normal heart sounds





- GI/Abdominal


GI/Abdominal exam: Soft, Normal bowel sounds, Tenderness.  negative: Distended, 

Guarding, Rebound, Rigid





- Rectal


Rectal exam: Deferred





- 


 exam: Deferred





- Extremities


Extremities exam: Normal inspection, Full ROM, Normal capillary refill.  

negative: Tenderness





- Back


Back exam: Reports: Normal inspection, Full ROM.  Denies: Muscle spasm, Rash 

noted, Tenderness





- Neurological


Neurological exam: Alert, Normal gait, Oriented X3, Reflexes normal





- Psychiatric


Psychiatric exam: Normal affect, Normal mood





- Skin


Skin exam: Dry, Intact, Normal color, Warm





Course





 Vital Signs











  18





  10:57


 


Temperature 99.3 F


 


Pulse Rate 95 H


 


Respiratory 16





Rate 


 


Blood Pressure 140/90


 


Pulse Ox 95














Medical Decision Making





- Lab Data


Result diagrams: 


 18 11:30





 18 11:30





 Lab Results











  18 Range/Units





  11:30 11:30 11:30 


 


WBC   11.6   (4.2-12.2)  K/uL


 


RBC   4.65   (3.80-5.40)  M/uL


 


Hgb   15.1   (11.6-16.0)  gm/dl


 


Hct   43.1   (35.0-47.0)  %


 


MCV   92.7   (81-97)  fl


 


MCH   32.5   (27-33)  pg


 


MCHC   35.0   (32-36)  g/dl


 


RDW   12.0   (11.5-14.5)  %


 


Plt Count   300   (130-400)  K/uL


 


MPV   9.3   (7.4-10.4)  fl


 


Neutrophils %   91.0 H   (47-80)  %


 


Eosinophils %   Not Reportable   


 


Basophils %   Not Reportable   


 


Lymphocytes   3.0 L   (16-45)  %


 


Monocytes   5.0   (0-9)  %


 


Eosinophil Count   1.0   (0-6)  %


 


Sodium    135 L  (136-145)  mmol/L


 


Potassium    3.6  (3.4-4.5)  mmol/L


 


Chloride    94 L  ()  mmol/L


 


Carbon Dioxide    27.0  (22-29)  mmol/L


 


Anion Gap    14.0  (7-16)  


 


BUN    15  (8-23)  mg/dL


 


Creatinine    0.6  (0.5-0.9)  mg/dL


 


Estimated GFR    > 60  mL/min


 


Random Glucose    131 H  ()  mg/dL


 


Calcium    9.6  (8.8-10.2)  mg/dL


 


Influenza Type A Ag  Negative    (NEGATIVE)  


 


Influenza Type B Ag  Negative    (NEGATIVE)  














Disposition


Clinical Impression: 


 Gastroenteritis





Disposition: Home, Self-Care


Condition: (1) Good


Instructions:  Gastroenteritis (ED)


Additional Instructions: 


follow up with Dr. Meyer in 3 days 





Time of Disposition: 13:33





Quality





- Quality Measures


Quality Measures: N/A





- Blood Pressure Screening


Does Patient Have Any of the Following: No, Active Dx of HTN


Blood Pressure Classification: Hypertensive Reading


Systolic Measurement: 140


Diastolic Measurement: 90


Screening for High Blood Pressure: Patient Exclusion, Hx of HTN []

## 2019-02-23 ENCOUNTER — HOSPITAL ENCOUNTER (EMERGENCY)
Dept: HOSPITAL 59 - ER | Age: 79
Discharge: HOME | End: 2019-02-23
Payer: MEDICARE

## 2019-02-23 DIAGNOSIS — I25.2: ICD-10-CM

## 2019-02-23 DIAGNOSIS — J06.9: Primary | ICD-10-CM

## 2019-02-23 DIAGNOSIS — I10: ICD-10-CM

## 2019-02-23 DIAGNOSIS — Z87.891: ICD-10-CM

## 2019-02-23 PROCEDURE — 99282 EMERGENCY DEPT VISIT SF MDM: CPT

## 2019-02-23 NOTE — EMERGENCY DEPARTMENT RECORD
History of Present Illness





- General


Chief complaint: Cold


Stated complaint: COLD


Time Seen by Provider: 19 20:00


Source: Patient


Mode of Arrival: Ambulatory


Limitations: No limitations





- History of Present Illness


Initial comments: 





78 yo female presents to ED for evaluation of congestion, runny nose, and sinus 

drainage for the past several days.  Patient reports that she is on day #4 of 

Zithromax prescribed by her PCP, denies fevers, chills, or productive cough 

symptoms.  Patient denies taking anything for her symptoms other than her 

prescribed antibiotic.  Patient denies health problems "right now".


MD complaint: Other


Onset/Timin


-: Week(s)


Severity: Moderate


Consistency: Constant


Improves with: None


Worsens with: None


Associated Symptoms: Rhinorrhea, Sore throat





- Related Data


 Previous Rx's











 Medication  Instructions  Recorded


 


Loratadine/Pseudoephedrine 1 tab PO DAILY #15 tab.er 19





[Claritin-D 24 Hour Tablet]  











 Allergies











Allergy/AdvReac Type Severity Reaction Status Date / Time


 


tramadol Allergy Severe ITCHING Verified 17 10:12


 


amoxicillin trihydrate Allergy Unknown PT UNSURE Verified 17 10:12





[From AMOXIL]   OF REACTION  


 


azithromycin [From ZITHROMAX] Allergy Unknown PT UNSURE Verified 17 10:12





   OF REACTION  


 


ciprofloxacin [From CIPRO] Allergy Unknown PT UNSURE Verified 17 10:12





   OF REACTION  


 


ciprofloxacin HCl Allergy Unknown PT UNSURE Verified 17 10:12





[From CIPRO]   OF REACTION  


 


clarithromycin Allergy Unknown PT UNSURE Verified 17 10:12





[CLARITHROMYCIN]   OF REACTION  


 


codeine [CODEINE] Allergy Unknown PT UNSURE Verified 17 10:12





   OF REACTION  


 


prednisone [PREDNISONE] Allergy Unknown PT Verified 17 10:12





   ALLERGIC  





   TO STEROIDS  


 


Sulfa (Sulfonamide Allergy Unknown ANAPHYLAXIS Verified 17 10:12





Antibiotics)     














Review of Systems


Constitutional: Denies: Chills, Fever, Malaise, Night sweats


Eyes: Denies: Eye discharge, Eye pain


ENT: Reports: Congestion, Throat pain.  Denies: Ear pain, Epistaxis


Respiratory: Reports: Cough.  Denies: Dyspnea


Cardiovascular: Denies: Chest pain, Dyspnea on exertion


Endocrine: Denies: Fatigue, Heat or cold intolerance


Gastrointestinal: Denies: Abdominal pain, Nausea, Vomiting


Genitourinary: Denies: Incontinence, Retention


Musculoskeletal: Denies: Arthralgia, Back pain


Skin: Denies: Bruising, Change in color


Neurological: Denies: Abnormal gait, Confusion, Headache, Seizure


Psychiatric: Denies: Anxiety


Hematological/Lymphatic: Denies: Anemia, Blood Clots





Past Medical History





- SOCIAL HISTORY


Smoking Status: Former smoker


Drug Use: None





- RESPIRATORY


Hx Respiratory Disorders: Yes


Hx Pneumonia: Yes





- CARDIOVASCULAR


Hx Cardio Disorders: Yes


Hx Heart Attack: Yes


Hx Hypertension: Yes





- NEURO


Hx Neuro Disorders: Yes


Hx CVA: Yes


Hx Headaches: Yes


Hx TIA: Yes





- GI


Hx GI Disorders: Yes


Hx Reflux: Yes (was on nexium)





- 


Hx Genitourinary Disorders: No





- ENDOCRINE


Hx Endocrine Disorders: No


Hx Diabetes: No


Hx Thyroid Disease: No





- MUSCULOSKELETAL


Hx Musculoskeletal Disorders: Yes


Hx Arthritis: Yes


Hx Osteoporosis: Yes





- PSYCH


Hx Psych Problems: No





- HEMATOLOGY/ONCOLOGY


Hx Hematology/Oncology Disorders: Yes


Hx Cancer: Yes (uterine)





Family Medical History


Hx Alcohol Use: Father


Hx Cancer: Brother/Sister


Hx Diabetes: Brother/Sister


Hx Heart Disease: Father, Brother/Sister


Hx Liver Disease: Father


Hx Resp Disorders: Father, Mother, Brother/Sister





Physical Exam





- General


General Appearance: Alert, Oriented x3, Cooperative, No acute distress


Limitations: No limitations





- Head


Head exam: Atraumatic, Normocephalic, Normal inspection


Head exam detail: negative: Abrasion, Contusion, Richard's sign, General 

tenderness, Hematoma, Laceration





- Eye


Eye exam: Normal appearance.  negative: Conjunctival injection, Periorbital 

swelling, Periorbital tenderness, Scleral icterus





- ENT


ENT exam: Normal orophraynx, TM's normal bilaterally


Ear exam: negative: Auricular hematoma, Auricular trauma


Nasal Exam: negative: Active bleeding, Discharge, Dried blood, Foreign body


Mouth exam: negative: Drooling, Laceration, Muffled voice, Tongue elevation


Throat exam: negative: Tonsillar erythema, Tonsillomegaly, R peritonsillar mass

, L peritonsillar mass





- Neck


Neck exam: Normal inspection.  negative: Meningismus, Tenderness





- Respiratory


Respiratory exam: Normal lung sounds bilaterally.  negative: Rales, Respiratory 

distress, Rhonchi, Stridor





- Cardiovascular


Cardiovascular Exam: Regular rate, Normal rhythm, Normal heart sounds





- GI/Abdominal


GI/Abdominal exam: Soft.  negative: Rebound, Rigid, Tenderness





- Rectal


Rectal exam: Deferred





- 


 exam: Deferred





- Extremities


Extremities exam: Normal inspection.  negative: Calf tenderness, Pedal edema, 

Tenderness





- Back


Back exam: Denies: CVA tenderness (R), CVA tenderness (L)





- Neurological


Neurological exam: Alert, Normal gait, Oriented X3





- Psychiatric


Psychiatric exam: Normal affect, Normal mood





- Skin


Skin exam: Normal color.  negative: Abrasion


Type of lesion: negative: abrasion





Course





 Vital Signs











  19





  20:05


 


Temperature 98.3 F


 


Pulse Rate [ 66





Pulse Ox Probe] 


 


Respiratory 16





Rate 


 


Blood Pressure 125/81





[Left Arm] 


 


Pulse Ox 96














- Reevaluation(s)


Reevaluation #1: 





19 20:14


Patient was seen and examined


No clear bacterial source of infection is identified on examination, patient is 

currently taking Zithromax as well.


History and examination are consistent with viral URI


Recommended Claritin-D for her symptoms, denies history of HTN on examination.


Patient is well appearing and stable for discharge at this time.





Disposition


Disposition: Discharge


Clinical Impression: 


URI (upper respiratory infection)


Qualifiers:


 URI type: unspecified URI Qualified Code(s): J06.9 - Acute upper respiratory 

infection, unspecified





Disposition: Home, Self-Care


Condition: (2) Stable


Instructions:  Cold Symptoms (ED)


Additional Instructions: 


Return to ED if your symptoms worsen or if you have any concerns.


Claritin as directed.


Follow-up with your family doctor in 3-5 days as directed.


Prescriptions: 


Loratadine/Pseudoephedrine [Claritin-D 24 Hour Tablet] 1 tab PO DAILY #15 tab.er


Forms:  Patient Portal Access


Time of Disposition: 20:10





Quality





- Quality Measures


Quality Measures: N/A





- Blood Pressure Screening


Does Patient Have Any of the Following: No


Blood Pressure Classification: Pre-Hypertensive BP Reading


Systolic Measurement: 125


Diastolic Measurement: 81


Screening for High Blood Pressure: < Pre-Hypertensive BP, F/U Documented > [

]


Pre-Hypertensive Follow-up Interventions: Referral to alternative/primary care 

provider.

## 2019-04-11 ENCOUNTER — HOSPITAL ENCOUNTER (EMERGENCY)
Dept: HOSPITAL 59 - ER | Age: 79
Discharge: HOME | End: 2019-04-11
Payer: MEDICARE

## 2019-04-11 DIAGNOSIS — I10: ICD-10-CM

## 2019-04-11 DIAGNOSIS — M19.011: Primary | ICD-10-CM

## 2019-04-11 DIAGNOSIS — R07.89: ICD-10-CM

## 2019-04-11 DIAGNOSIS — R06.02: ICD-10-CM

## 2019-04-11 DIAGNOSIS — M47.894: ICD-10-CM

## 2019-04-11 DIAGNOSIS — I25.2: ICD-10-CM

## 2019-04-11 DIAGNOSIS — F17.210: ICD-10-CM

## 2019-04-11 LAB
ALBUMIN SERPL-MCNC: 4 G/DL (ref 4–5)
ALBUMIN/GLOB SERPL: 1.2 {RATIO} (ref 1.1–1.8)
ALP SERPL-CCNC: 86 U/L (ref 35–104)
ALT SERPL-CCNC: 17 U/L (ref ?–33)
ANION GAP SERPL CALC-SCNC: 11 MMOL/L (ref 7–16)
AST SERPL-CCNC: 20 U/L (ref 10–35)
BASOPHILS NFR BLD: 0.4 % (ref 0–6)
BILIRUB SERPL-MCNC: 0.6 MG/DL (ref 0.2–1)
BUN SERPL-MCNC: 12 MG/DL (ref 8–23)
CK MB SERPL-MCNC: 1.4 NG/ML (ref ?–3.77)
CO2 SERPL-SCNC: 27 MMOL/L (ref 22–29)
CREAT SERPL-MCNC: 0.5 MG/DL (ref 0.5–0.9)
CREATINE PHOSPHOKINASE: 32 U/L (ref 26–192)
EOSINOPHIL NFR BLD: 3.8 % (ref 0–6)
ERYTHROCYTE [DISTWIDTH] IN BLOOD BY AUTOMATED COUNT: 12.3 % (ref 11.5–14.5)
EST GLOMERULAR FILTRATION RATE: > 60 ML/MIN
GLOBULIN SER-MCNC: 3.4 GM/DL (ref 1.4–4.8)
GLUCOSE SERPL-MCNC: 115 MG/DL (ref 74–109)
GRANULOCYTES NFR BLD: 67.6 % (ref 47–80)
HCT VFR BLD CALC: 41.2 % (ref 35–47)
HGB BLD-MCNC: 14 GM/DL (ref 11.6–16)
LYMPHOCYTES NFR BLD AUTO: 19.2 % (ref 16–45)
MCH RBC QN AUTO: 31.4 PG (ref 27–33)
MCHC RBC AUTO-ENTMCNC: 34 G/DL (ref 32–36)
MCV RBC AUTO: 92.4 FL (ref 81–97)
MONOCYTES NFR BLD: 9 % (ref 0–9)
PLATELET # BLD: 260 K/UL (ref 130–400)
PMV BLD AUTO: 9.2 FL (ref 7.4–10.4)
PROT SERPL-MCNC: 7.4 G/DL (ref 6.6–8.7)
RBC # BLD AUTO: 4.46 M/UL (ref 3.8–5.4)
WBC # BLD AUTO: 7.1 K/UL (ref 4.2–12.2)

## 2019-04-11 PROCEDURE — 85025 COMPLETE CBC W/AUTO DIFF WBC: CPT

## 2019-04-11 PROCEDURE — 82553 CREATINE MB FRACTION: CPT

## 2019-04-11 PROCEDURE — 99284 EMERGENCY DEPT VISIT MOD MDM: CPT

## 2019-04-11 PROCEDURE — 71046 X-RAY EXAM CHEST 2 VIEWS: CPT

## 2019-04-11 PROCEDURE — 84484 ASSAY OF TROPONIN QUANT: CPT

## 2019-04-11 PROCEDURE — 80048 BASIC METABOLIC PNL TOTAL CA: CPT

## 2019-04-11 PROCEDURE — 93005 ELECTROCARDIOGRAM TRACING: CPT

## 2019-04-11 PROCEDURE — 80053 COMPREHEN METABOLIC PANEL: CPT

## 2019-04-11 PROCEDURE — 82550 ASSAY OF CK (CPK): CPT

## 2019-04-11 PROCEDURE — 93010 ELECTROCARDIOGRAM REPORT: CPT

## 2019-04-11 NOTE — EMERGENCY DEPARTMENT RECORD
History of Present Illness





- General


Chief complaint: Extremity Problem


Stated complaint: RIGHT SIDED PAIN


Time Seen by Provider: 19 09:13


Source: Patient


Mode of Arrival: Ambulatory


Limitations: No limitations





- History of Present Illness


Initial comments: 


The patient is here due to a 3-4 day hx of R sided chest, shoulder, arm and 

back pain. The patient states she woke up with the pain 4 days ago. It is an 

intermittent sharp stabbing pain mainly around the R shoulder and back and 

sometimes in the front of the chest. She states the pain is worse with any 

twisting, bending, or R shoulder ROM. The patient denies any trauma, injury, 

fall, fever, cough, pleuritic pain or L sided CP. The patient has a hx of 

arthritis with similar pain in the past.





MD Complaint: Joint pain, Other


Onset/Timin


-: Days(s)


Location: Right, Arm, Shoulder, Other


Severity scale (1-10): 5


Quality: Aching, Sharp, Other


Consistency: Intermittent


Improves with: Rest


Worsens with: Exertion


Associated Symptoms: Shortness of breath





- Related Data


 Allergies











Allergy/AdvReac Type Severity Reaction Status Date / Time


 


tramadol Allergy Severe ITCHING Verified 19 08:57


 


amoxicillin trihydrate Allergy Unknown PT UNSURE Verified 19 08:57





[From AMOXIL]   OF REACTION  


 


azithromycin [From ZITHROMAX] Allergy Unknown PT UNSURE Verified 19 08:57





   OF REACTION  


 


ciprofloxacin [From CIPRO] Allergy Unknown PT UNSURE Verified 19 08:57





   OF REACTION  


 


ciprofloxacin HCl Allergy Unknown PT UNSURE Verified 19 08:57





[From CIPRO]   OF REACTION  


 


clarithromycin Allergy Unknown PT UNSURE Verified 19 08:57





[CLARITHROMYCIN]   OF REACTION  


 


codeine [CODEINE] Allergy Unknown PT UNSURE Verified 19 08:57





   OF REACTION  


 


prednisone [PREDNISONE] Allergy Unknown PT Verified 19 08:57





   ALLERGIC  





   TO STEROIDS  


 


Sulfa (Sulfonamide Allergy Unknown ANAPHYLAXIS Verified 19 08:57





Antibiotics)     














Travel Screening





- Travel/Exposure Within Last 30 Days


Have you traveled within the last 30 days?: No





- Travel/Exposure Within Last Year


Have you traveled outside the [place] in the last year?: No





- Additonal Travel Details


Have you been exposed to anyone with a communicable illness?: No





- Travel Symptoms


Symptom Screening: None





Review of Systems


Constitutional: Denies: Chills, Fever


Eyes: Denies: Eye discharge


ENT: Denies: Congestion


Respiratory: Denies: Cough


Cardiovascular: Denies: Arrhythmia, Chest pain


Endocrine: Denies: Fatigue


Gastrointestinal: Denies: Nausea


Genitourinary: Denies: Dysuria


Musculoskeletal: Reports: Back pain.  Denies: Arthralgia


Skin: Denies: Bruising





Past Medical History





- SOCIAL HISTORY


Smoking Status: Former smoker


Alcohol Use: None


Drug Use: None





- RESPIRATORY


Hx Respiratory Disorders: Yes


Hx Pneumonia: Yes





- CARDIOVASCULAR


Hx Cardio Disorders: Yes


Hx Heart Attack: Yes


Hx Hypertension: Yes





- NEURO


Hx Neuro Disorders: Yes


Hx CVA: Yes


Hx Headaches: Yes


Hx TIA: Yes





- GI


Hx GI Disorders: Yes


Hx Reflux: Yes (was on nexium)





- 


Hx Genitourinary Disorders: No





- ENDOCRINE


Hx Endocrine Disorders: No


Hx Diabetes: No


Hx Thyroid Disease: No





- MUSCULOSKELETAL


Hx Musculoskeletal Disorders: Yes


Hx Arthritis: Yes


Hx Osteoporosis: Yes





- PSYCH


Hx Psych Problems: No





- HEMATOLOGY/ONCOLOGY


Hx Hematology/Oncology Disorders: Yes


Hx Cancer: Yes (uterine)





Family Medical History


Any Significant Family History?: Yes


Hx Alcohol Use: Father


Hx Cancer: Brother/Sister


Hx Diabetes: Brother/Sister


Hx Heart Disease: Father, Brother/Sister


Hx Liver Disease: Father


Hx Resp Disorders: Father, Mother, Brother/Sister





Physical Exam





- General


General Appearance: Alert, Oriented x3, Cooperative, No acute distress





- Head


Head exam: Atraumatic, Normocephalic, Normal inspection





- Eye


Eye exam: Normal appearance, PERRL, EOMI





- ENT


Throat exam: Normal inspection.  negative: Tonsillar erythema, Tonsillar exudate





- Neck


Neck exam: Normal inspection, Full ROM.  negative: Lymphadenopathy, Tenderness





- Respiratory


Respiratory exam: Normal lung sounds bilaterally, Chest wall tenderness (The R 

sided CP is 100% reproducible to palpation.).  negative: Respiratory distress





- Cardiovascular


Cardiovascular Exam: Regular rate, Normal rhythm, Normal heart sounds





- GI/Abdominal


GI/Abdominal exam: Soft, Normal bowel sounds.  negative: Tenderness





- Extremities


Extremities exam: Normal inspection, Full ROM, Normal capillary refill.  

negative: Tenderness


Image of Full Body: 


  __________________________














  __________________________





 1 - Area of pain and tenderness.





 2 - Area of pain and tenderness.








- Back


Back exam: Reports: Normal inspection, Paraspinal tenderness (The R back pain 

is very reproducible to palpation.).  Denies: Vertebral tenderness





- Neurological


Neurological exam: Alert, Normal gait.  negative: Abnormal gait, Motor sensory 

deficit





Course





 Vital Signs











  19





  09:03


 


Temperature 98 F


 


Pulse Rate 69


 


Respiratory 20





Rate 


 


Blood Pressure 144/76


 


Pulse Ox 96














- Reevaluation(s)


Reevaluation #1: 


The patient is doing a lot better at this time. The Tylenol has improved the 

pain and now she only has pain with movement of her R shoulder. I did discuss 

the need for F/U with her PCP.


19 10:27








Medical Decision Making





- Data Complexity


MDM Data: Labs Ordered and/or Reviewed, X-Ray Ordered and/or Reviewed, EKG 

Ordered and/or Reviewed





- Lab Data


Result diagrams: 


 19 09:35





 19 09:35





- EKG Data


-: EKG Interpreted by Me


EKG: No Acute Changes, Unchanged From Previous





- Radiology Data


Radiology results: Report reviewed (CXR: Neg per Rad.)





Disposition


Disposition: Discharge


Clinical Impression: 


Osteoarthritis


Qualifiers:


 Osteoarthritis location: unspecified site Osteoarthritis type: unspecified 

Qualified Code(s): M19.90 - Unspecified osteoarthritis, unspecified site





Disposition: Home, Self-Care


Condition: (2) Stable


Instructions:  Osteoarthritis (ED)


Additional Instructions: 


Please take Tylenol for pain and see your family doctor early next week for 

recheck. Return to the ER for any worsening symptoms.


Forms:  Patient Portal Access


Time of Disposition: 10:15





Quality





- Quality Measures


Quality Measures: Blunt Head Trauma (>2yr)





- Blunt Head Trauma - Adult


Quality Measure: Measure #415: Utilization of CT for Minor Blunt Head Trauma


ICD10 Codes Entered: Yes


View Details: Yes


Was CT ordered: No


Henny Score: Please complete Henny Coma Scale above


Utilization of CT for Minor Blunt Head Trauma: Not Eligible For Measure


Additional Inclusion Criteria: More than 24hrs (OR) GCS not 15 (OR) CT not 

ordered.


Not Eligible Reason: CT Not Ordered





- Blood Pressure Screening


View Details: Yes


Does Patient Have Any of the Following: No


Blood Pressure Classification: Hypertensive Reading


Systolic Measurement: 144


Diastolic Measurement: 76


Screening for High Blood Pressure: < First Hypertensive BP, F/U Documented > [

]


First Hypertensive Follow-up Interventions: Referral to alternative/primary 

care provider.

## 2019-10-02 ENCOUNTER — HOSPITAL ENCOUNTER (EMERGENCY)
Dept: HOSPITAL 59 - ER | Age: 79
Discharge: HOME | End: 2019-10-02
Payer: MEDICARE

## 2019-10-02 DIAGNOSIS — J20.9: ICD-10-CM

## 2019-10-02 DIAGNOSIS — M94.0: Primary | ICD-10-CM

## 2019-10-02 DIAGNOSIS — R07.1: ICD-10-CM

## 2019-10-02 LAB
ABSOLUTE NEUTROPHIL COUNT: 5.79
ANION GAP SERPL CALC-SCNC: 11 MMOL/L (ref 7–16)
BASOPHILS NFR BLD: 0.5 % (ref 0–6)
BUN SERPL-MCNC: 13 MG/DL (ref 8–23)
CO2 SERPL-SCNC: 28 MMOL/L (ref 22–29)
CREAT SERPL-MCNC: 0.5 MG/DL (ref 0.5–0.9)
EOSINOPHIL NFR BLD: 2.8 % (ref 0–6)
ERYTHROCYTE [DISTWIDTH] IN BLOOD BY AUTOMATED COUNT: 12.2 % (ref 11.5–14.5)
EST GLOMERULAR FILTRATION RATE: > 60 ML/MIN
GLUCOSE SERPL-MCNC: 112 MG/DL (ref 74–109)
GRANULOCYTES NFR BLD: 69.2 % (ref 47–80)
HCT VFR BLD CALC: 40 % (ref 35–47)
HGB BLD-MCNC: 13.8 GM/DL (ref 11.6–16)
LYMPHOCYTES NFR BLD AUTO: 19.1 % (ref 16–45)
MCH RBC QN AUTO: 31.7 PG (ref 27–33)
MCHC RBC AUTO-ENTMCNC: 34.5 G/DL (ref 32–36)
MCV RBC AUTO: 92 FL (ref 81–97)
MONOCYTES NFR BLD: 8.4 % (ref 0–9)
PLATELET # BLD: 291 K/UL (ref 130–400)
PMV BLD AUTO: 9.5 FL (ref 7.4–10.4)
RBC # BLD AUTO: 4.35 M/UL (ref 3.8–5.4)
WBC # BLD AUTO: 8.4 K/UL (ref 4.2–12.2)

## 2019-10-02 PROCEDURE — 93005 ELECTROCARDIOGRAM TRACING: CPT

## 2019-10-02 PROCEDURE — 71046 X-RAY EXAM CHEST 2 VIEWS: CPT

## 2019-10-02 PROCEDURE — 85730 THROMBOPLASTIN TIME PARTIAL: CPT

## 2019-10-02 PROCEDURE — 80048 BASIC METABOLIC PNL TOTAL CA: CPT

## 2019-10-02 PROCEDURE — 99284 EMERGENCY DEPT VISIT MOD MDM: CPT

## 2019-10-02 PROCEDURE — 93010 ELECTROCARDIOGRAM REPORT: CPT

## 2019-10-02 PROCEDURE — 85025 COMPLETE CBC W/AUTO DIFF WBC: CPT

## 2019-10-02 PROCEDURE — 87880 STREP A ASSAY W/OPTIC: CPT

## 2019-10-02 PROCEDURE — 84484 ASSAY OF TROPONIN QUANT: CPT

## 2019-10-02 NOTE — EMERGENCY DEPARTMENT RECORD
History of Present Illness





- General


Chief Complaint: Chest Pain


Stated Complaint: CHEST PAIN


Time Seen by Provider: 10/02/19 09:12


Source: Patient, RN notes reviewed


Mode of Arrival: Wheelchair





- History of Present Illness


Initial Comments: 


patient had a sharp chest pain under the left breast which lasted for a few 

seconds and is gone now and was reproducible to palpation in the left chest area

and in the epigastric area of abd. She also has left shoulder pain which hurts 

with motion and palpation. Patient also has a sore throat and sinus congestion 

and cough





MD Complaint: Chest pain


Onset/Timing: 3


-: Week(s)


Pain Location: Left chest


Pain Radiation: LUE


Severity scale (1-10): 2


Quality: Heaviness


Consistency: Constant


Improves With: Nothing


Worsens With: Movement


Treatments Prior to Arrival: None





- Related Data


                                Home Medications











 Medication  Instructions  Recorded  Confirmed  Last Taken


 


Lisinopril/Hydrochlorothiazide 1 tab PO DAILY 10/02/19 10/02/19 10/02/19





[Zestoretic 20-12.5 mg Tablet]    








                                  Previous Rx's











 Medication  Instructions  Recorded


 


Azithromycin 250 mg PO DAILY #6 tablet 10/02/19











                                    Allergies











Allergy/AdvReac Type Severity Reaction Status Date / Time


 


tramadol Allergy Severe ITCHING Verified 08/30/19 18:35


 


amoxicillin trihydrate Allergy Unknown PT UNSURE Verified 08/30/19 18:35





[From AMOXIL]   OF REACTION  


 


azithromycin [From ZITHROMAX] Allergy Unknown PT UNSURE Verified 08/30/19 18:35





   OF REACTION  


 


ciprofloxacin [From CIPRO] Allergy Unknown PT UNSURE Verified 08/30/19 18:35





   OF REACTION  


 


ciprofloxacin HCl Allergy Unknown PT UNSURE Verified 08/30/19 18:35





[From CIPRO]   OF REACTION  


 


clarithromycin Allergy Unknown PT UNSURE Verified 08/30/19 18:35





[CLARITHROMYCIN]   OF REACTION  


 


codeine [CODEINE] Allergy Unknown PT UNSURE Verified 08/30/19 18:35





   OF REACTION  


 


prednisone [PREDNISONE] Allergy Unknown PT Verified 08/30/19 18:35





   ALLERGIC  





   TO STEROIDS  


 


Sulfa (Sulfonamide Allergy Unknown ANAPHYLAXIS Verified 08/30/19 18:35





Antibiotics)     














Travel Screening





- Travel/Exposure Within Last 30 Days


Have you traveled within the last 30 days?: No





- Travel/Exposure Within Last Year


Have you traveled outside the [place] in the last year?: No





- Additonal Travel Details


Have you been exposed to anyone with a communicable illness?: No





- Travel Symptoms


Symptom Screening: None





Review of Systems


Reviewed: No additional complaints except as noted below


Constitutional: Reports: As per HPI.  Denies: Chills, Fever, Malaise, Night 

sweats, Weakness, Weight change


Eyes: Reports: As per HPI.  Denies: Eye discharge, Eye pain, Photophobia, Vision

 change


ENT: Reports: As per HPI, Congestion, Throat pain.  Denies: Dental pain, Ear 

pain, Epistaxis, Hearing loss


Respiratory: Reports: As per HPI, Cough.  Denies: Dyspnea, Hemoptysis, Stridor, 

Wheezes


Cardiovascular: Reports: As per HPI, Chest pain.  Denies: Arrhythmia, Dyspnea on

 exertion, Edema, Murmurs, Orthopnea, Palpitations, Paroxysmal nocturnal 

dyspnea, Rheumatic Fever, Syncope


Endocrine: Reports: As per HPI.  Denies: Fatigue, Heat or cold intolerance, 

Polydipsia, Polyuria


Gastrointestinal: Reports: As per HPI.  Denies: Abdominal pain, Constipation, 

Diarrhea, Hematemesis, Hematochezia, Melena, Nausea, Vomiting


Genitourinary: Reports: As per HPI.  Denies: Abnormal menses, Discharge, 

Dyspareunia, Dysuria, Frequency, Hematuria, Incontinence, Retention, Urgency


Musculoskeletal: Reports: As per HPI, Arthralgia.  Denies: Back pain, Gout, 

Joint swelling, Myalgia, Neck pain


Skin: Reports: As per HPI.  Denies: Bruising, Change in color, Change in 

hair/nails, Lesions, Pruritus, Rash


Neurological: Reports: As per HPI.  Denies: Abnormal gait, Confusion, Headache, 

Numbness, Paresthesias, Seizure, Tingling, Tremors, Vertigo, Weakness


Psychiatric: Reports: As per HPI.  Denies: Anxiety, Auditory hallucinations, 

Depression, Homicidal thoughts, Suicidal thoughts, Visual hallucinations


Hematological/Lymphatic: Reports: As per HPI.  Denies: Anemia, Blood Clots, Easy

 bleeding, Easy bruising, Swollen glands





Past Medical History





- SOCIAL HISTORY


Smoking Status: Former smoker


Alcohol Use: None


Drug Use: None





- RESPIRATORY


Hx Respiratory Disorders: Yes


Hx Pneumonia: Yes





- CARDIOVASCULAR


Hx Cardio Disorders: Yes


Hx Heart Attack: Yes


Hx Hypertension: Yes





- NEURO


Hx Neuro Disorders: Yes


Hx CVA: Yes


Hx Headaches: Yes


Hx TIA: Yes





- GI


Hx GI Disorders: Yes


Hx Reflux: Yes (was on nexium)





- 


Hx Genitourinary Disorders: No





- ENDOCRINE


Hx Endocrine Disorders: No


Hx Diabetes: No


Hx Thyroid Disease: No





- MUSCULOSKELETAL


Hx Musculoskeletal Disorders: Yes


Hx Arthritis: Yes


Hx Osteoporosis: Yes





- PSYCH


Hx Psych Problems: No





- HEMATOLOGY/ONCOLOGY


Hx Hematology/Oncology Disorders: Yes


Hx Cancer: Yes (uterine)





Family Medical History


Any Significant Family History?: Yes


Hx Alcohol Use: Father


Hx Cancer: Brother/Sister


Hx Diabetes: Brother/Sister


Hx Heart Disease: Father, Brother/Sister


Hx Liver Disease: Father


Hx Resp Disorders: Father, Mother, Brother/Sister





Physical Exam





- General


General Appearance: Alert, Oriented x3, Cooperative, No acute distress





- Head


Head exam: Normal inspection





- Eye


Eye exam: Normal appearance, PERRL


Pupils: Normal accommodation





- ENT


ENT exam: Normal exam, Mucous membranes moist, Normal external ear exam, Normal 

orophraynx, TM's normal bilaterally


Ear exam: Normal external inspection.  negative: External canal tenderness


Nasal Exam: Normal inspection.  negative: Discharge, Sinus tenderness


Mouth exam: Normal external inspection, Tongue normal


Teeth exam: Normal inspection.  negative: Dental caries


Throat exam: Normal inspection.  negative: Tonsillar erythema, Tonsillar exudate





- Neck


Neck exam: Normal inspection, Full ROM.  negative: Tenderness





- Respiratory


Respiratory exam: Normal lung sounds bilaterally.  negative: Respiratory 

distress





- Cardiovascular


Cardiovascular Exam: Regular rate, Normal rhythm, Normal heart sounds, Other 

(pain reproducible on palpation left chest and with moving her shoulder)





- GI/Abdominal


GI/Abdominal exam: Soft, Normal bowel sounds.  negative: Tenderness





- Rectal


Rectal exam: Deferred





- 


 exam: Deferred





- Extremities


Extremities exam: Normal inspection, Full ROM, Normal capillary refill.  

negative: Tenderness





- Back


Back exam: Reports: Normal inspection, Full ROM.  Denies: Muscle spasm, Rash 

noted, Tenderness





- Neurological


Neurological exam: Alert, Normal gait, Oriented X3, Reflexes normal





- Psychiatric


Psychiatric exam: Normal affect, Normal mood





- Skin


Skin exam: Dry, Intact, Normal color, Warm





Course





                                   Vital Signs











  10/02/19





  09:21


 


Temperature 98 F


 


Pulse Rate 60


 


Respiratory 20





Rate 


 


Blood Pressure 123/66


 


Pulse Ox 95














Medical Decision Making





- Lab Data


Result diagrams: 


                                 10/02/19 09:20





                                 10/02/19 09:20





Disposition


Clinical Impression: 


 Chest wall syndrome, Bronchitis





Chest pain


Qualifiers:


 Chest pain type: unspecified Qualified Code(s): R07.9 - Chest pain, unspecified





Disposition: Home, Self-Care


Condition: (1) Good


Instructions:  Costochondritis (ED), Acute Bronchitis (ED)


Additional Instructions: 


follow up with Dr Meyer in 2-5 days


tylenol for pain





Prescriptions: 


Azithromycin 250 mg PO DAILY #6 tablet


Forms:  Patient Portal Access


Time of Disposition: 11:56





Quality





- Quality Measures


Quality Measures: N/A





- Blood Pressure Screening


Does Patient Have Any of the Following: No, Active Dx of HTN


Blood Pressure Classification: Pre-Hypertensive BP Reading


Systolic Measurement: 123


Diastolic Measurement: 66


Screening for High Blood Pressure: Patient Exclusion, Hx of HTN []

## 2019-10-04 NOTE — RADIOLOGY REPORT
EXAM:  CHEST, TWO VIEWS



HISTORY:  PATIENT HAS CHEST PAIN.  



TECHNIQUE:  Two views of the chest are provided along with the comparison chest 
x-ray dated         4/11/19.  



FINDINGS:  The cardiomediastinal silhouette is within normal limits for size and
contour.  The malik appear unremarkable.  Chronic interstitial changes are 
identified bilaterally.  There is no radiographic evidence of a focal 
infiltrate, pleural effusion, or pneumothorax.  



Multilevel degenerative disk disease of the thoracic spine is identified.  



Osteoarthritic changes of the bilateral shoulders are noted.  



IMPRESSION:  

STABLE RADIOGRAPHIC APPEARANCE OF THE CHEST WITH RESPECT TO THE PRIOR 
EXAMINATION.  



JOB NUMBER:  [id number]

[employer]

## 2019-10-11 ENCOUNTER — HOSPITAL ENCOUNTER (EMERGENCY)
Dept: HOSPITAL 59 - ER | Age: 79
Discharge: HOME | End: 2019-10-11
Payer: MEDICARE

## 2019-10-11 DIAGNOSIS — I10: ICD-10-CM

## 2019-10-11 DIAGNOSIS — R13.10: Primary | ICD-10-CM

## 2019-10-11 DIAGNOSIS — R07.0: ICD-10-CM

## 2019-10-11 DIAGNOSIS — I25.2: ICD-10-CM

## 2019-10-11 DIAGNOSIS — Z87.891: ICD-10-CM

## 2019-10-11 PROCEDURE — 99283 EMERGENCY DEPT VISIT LOW MDM: CPT

## 2019-10-11 PROCEDURE — 70360 X-RAY EXAM OF NECK: CPT

## 2019-10-11 NOTE — EMERGENCY DEPARTMENT RECORD
History of Present Illness





- General


Chief complaint: ENT


Stated complaint: SOMETHING STUCK IN THROAT


Time Seen by Provider: 10/11/19 19:07


Source: Patient


Mode of Arrival: Ambulatory


Limitations: No limitations





- History of Present Illness


Initial comments: 





78 yo female presents to ED for evaluation of pain with swallowing that began 

this morning.  Patient reports that she was drinking a bottle of water, believes

there may have been something in the water that she swallowed caused it to be 

lodged in the lower esophagus.  Patient has been eating, drinking, and 

swallowing her secretions without difficulty throughout the day.  


MD complaint: Difficulty swallowing


Onset/Timin


-: Days(s)


Severity: Mild


Consistency: Intermittent


Improves with: None


Worsens with: Swallowing





- Related Data


                                Home Medications











 Medication  Instructions  Recorded  Confirmed  Last Taken


 


Prednisone [Prednisone 20Mg] 20 mg PO BID 10/11/19 10/11/19 Unknown








                                  Previous Rx's











 Medication  Instructions  Recorded


 


Azithromycin 250 mg PO DAILY #6 tablet 10/02/19











                                    Allergies











Allergy/AdvReac Type Severity Reaction Status Date / Time


 


tramadol Allergy Severe ITCHING Verified 10/11/19 19:18


 


amoxicillin trihydrate Allergy Unknown PT UNSURE Verified 10/11/19 19:18





[From AMOXIL]   OF REACTION  


 


azithromycin [From ZITHROMAX] Allergy Unknown PT UNSURE Verified 10/11/19 19:18





   OF REACTION  


 


ciprofloxacin [From CIPRO] Allergy Unknown PT UNSURE Verified 10/11/19 19:18





   OF REACTION  


 


ciprofloxacin HCl Allergy Unknown PT UNSURE Verified 10/11/19 19:18





[From CIPRO]   OF REACTION  


 


clarithromycin Allergy Unknown PT UNSURE Verified 10/11/19 19:18





[CLARITHROMYCIN]   OF REACTION  


 


codeine [CODEINE] Allergy Unknown PT UNSURE Verified 10/11/19 19:18





   OF REACTION  


 


prednisone [PREDNISONE] Allergy Unknown PT Verified 10/11/19 19:18





   ALLERGIC  





   TO STEROIDS  


 


Sulfa (Sulfonamide Allergy Unknown ANAPHYLAXIS Verified 10/11/19 19:18





Antibiotics)     














Review of Systems


Constitutional: Denies: Chills, Fever, Malaise, Night sweats


Eyes: Denies: Eye discharge, Eye pain


ENT: Reports: Throat pain.  Denies: Congestion, Ear pain, Epistaxis


Respiratory: Denies: Cough, Dyspnea


Cardiovascular: Denies: Chest pain, Dyspnea on exertion


Endocrine: Denies: Fatigue, Heat or cold intolerance


Gastrointestinal: Denies: Abdominal pain, Nausea, Vomiting


Genitourinary: Denies: Incontinence, Retention


Musculoskeletal: Denies: Arthralgia, Back pain


Skin: Denies: Bruising, Change in color


Neurological: Denies: Abnormal gait, Confusion, Headache, Tingling, Tremors


Psychiatric: Denies: Anxiety


Hematological/Lymphatic: Denies: Anemia, Blood Clots





Past Medical History





- SOCIAL HISTORY


Smoking Status: Former smoker


Drug Use: None





- RESPIRATORY


Hx Respiratory Disorders: Yes


Hx Pneumonia: Yes





- CARDIOVASCULAR


Hx Cardio Disorders: Yes


Hx Heart Attack: Yes


Hx Hypertension: Yes





- NEURO


Hx Neuro Disorders: Yes


Hx CVA: Yes


Hx Headaches: Yes


Hx TIA: Yes





- GI


Hx GI Disorders: Yes


Hx Reflux: Yes (was on nexium)





- 


Hx Genitourinary Disorders: No





- ENDOCRINE


Hx Endocrine Disorders: No


Hx Diabetes: No


Hx Thyroid Disease: No





- MUSCULOSKELETAL


Hx Musculoskeletal Disorders: Yes


Hx Arthritis: Yes


Hx Osteoporosis: Yes





- PSYCH


Hx Psych Problems: No





- HEMATOLOGY/ONCOLOGY


Hx Hematology/Oncology Disorders: Yes


Hx Cancer: Yes (uterine)





Family Medical History


Hx Alcohol Use: Father


Hx Cancer: Brother/Sister


Hx Diabetes: Brother/Sister


Hx Heart Disease: Father, Brother/Sister


Hx Liver Disease: Father


Hx Resp Disorders: Father, Mother, Brother/Sister





Physical Exam





- General


General Appearance: Alert, Oriented x3, Cooperative, No acute distress, Other 

(No respiratory distress noted, handling secretions normally on examination)


Limitations: No limitations





- Head


Head exam: Atraumatic, Normocephalic, Normal inspection


Head exam detail: negative: Abrasion, Contusion, Richard's sign, General 

tenderness, Hematoma, Laceration





- Eye


Eye exam: Normal appearance.  negative: Conjunctival injection, Periorbital 

swelling, Periorbital tenderness, Scleral icterus





- ENT


Ear exam: negative: Auricular hematoma, Auricular trauma


Nasal Exam: negative: Active bleeding, Discharge, Dried blood, Foreign body


Mouth exam: negative: Drooling, Laceration, Muffled voice, Tongue elevation


Throat exam: negative: Tonsillar erythema, Tonsillomegaly, R peritonsillar mass,

 L peritonsillar mass





- Neck


Neck exam: Normal inspection.  negative: Meningismus, Tenderness





- Respiratory


Respiratory exam: Normal lung sounds bilaterally.  negative: Respiratory 

distress, Rhonchi, Stridor, Wheezes





- Cardiovascular


Cardiovascular Exam: Regular rate, Normal rhythm, Normal heart sounds





- GI/Abdominal


GI/Abdominal exam: Soft.  negative: Distended, Rebound, Rigid, Tenderness





- Rectal


Rectal exam: Deferred





- 


 exam: Deferred





- Extremities


Extremities exam: Normal inspection.  negative: Pedal edema, Tenderness





- Back


Back exam: Denies: CVA tenderness (R), CVA tenderness (L)





- Neurological


Neurological exam: Alert, Normal gait, Oriented X3





- Psychiatric


Psychiatric exam: Normal affect, Normal mood





- Skin


Skin exam: Normal color.  negative: Abrasion


Type of lesion: negative: abrasion





Course





                                   Vital Signs











  10/11/19





  19:12


 


Temperature 98.2 F


 


Pulse Rate [ 64





Pulse Ox Probe] 


 


Respiratory 24





Rate 


 


Blood Pressure 121/60





[Left Arm] 


 


Pulse Ox 95














- Reevaluation(s)


Reevaluation #1: 





10/11/19 19:39


Soft-tissue neck:


No acute abnormality





Patient was reassessed and updated on her radiograph result.


Patient is drinking water easily without evidence for esophageal obstruction, no

evidence for aspiration on examination.


Patient appears stable for discharge at this time with instructions to follow-up

with her PCP in 1-3 days as directed.





Disposition


Disposition: Discharge


Clinical Impression: 


Dysphagia


Qualifiers:


 Dysphagia type: esophageal phase Qualified Code(s): R13.10 - Dysphagia, 

unspecified





Disposition: Home, Self-Care


Condition: (2) Stable


Instructions:  Dysphagia (ED)


Additional Instructions: 


Return to ED if your symptoms worsen or if you have any concerns.


Follow-up with your family doctor in 3-5 days as directed.


Forms:  Patient Portal Access


Time of Disposition: 19:41





Quality





- Quality Measures


Quality Measures: N/A





- Blood Pressure Screening


Does Patient Have Any of the Following: No


Blood Pressure Classification: Pre-Hypertensive BP Reading


Systolic Measurement: 121


Diastolic Measurement: 60


Screening for High Blood Pressure: < Pre-Hypertensive BP, F/U Documented > 

[]


Pre-Hypertensive Follow-up Interventions: Referral to alternative/primary care 

provider.

## 2019-10-12 NOTE — RADIOLOGY REPORT
EXAM:  NECK, SOFT TISSUE



HISTORY:  THROAT PAIN.



TECHNIQUE:  AP and lateral views of the neck for purposes of soft tissue 
evaluation. 



COMPARISON:  No prior soft tissue neck or cervical spine series.



FINDINGS:  There is narrowing particularly of the fourth through the sixth 
cervical interspaces with associated prominent hypertrophic spurring. Advanced 
degenerative change at the odontoid-anterior arch of C1 articulation. Advanced 
multilevel facet joint arthropathy. Epiglottis appears of normal size. No 
distention of the hypopharynx. In fact, the hypopharynx is largely void of air 
and not well demonstrated on the plain films. No narrowing of the subglottic 
airway evident. No obvious foreign body seen, although some foreign bodies will 
be radiographically indistinguishable from the adjacent soft tissues. The 
patient is noted to be edentulous. 



IMPRESSION:  

1.  EXTENSIVE DIFFUSE DEGENERATIVE CHANGES IN THE CERVICAL SPINE.

2.  HYPOPHARYNX RELATIVELY VOID OF AIR WITH RESULTING POOR VISUALIZATION OF THE 
HYPOPHARYNX. HOWEVER, THE EPIGLOTTIS APPEARS OF NORMAL SIZE. 



JOB NUMBER:  [id number]

[facility]

## 2019-12-19 ENCOUNTER — HOSPITAL ENCOUNTER (EMERGENCY)
Dept: HOSPITAL 59 - ER | Age: 79
Discharge: HOME | End: 2019-12-19
Payer: MEDICARE

## 2019-12-19 DIAGNOSIS — I10: ICD-10-CM

## 2019-12-19 DIAGNOSIS — R19.7: ICD-10-CM

## 2019-12-19 DIAGNOSIS — R11.2: Primary | ICD-10-CM

## 2019-12-19 DIAGNOSIS — Z87.891: ICD-10-CM

## 2019-12-19 LAB
ABSOLUTE NEUTROPHIL COUNT: 6.69
ALBUMIN SERPL-MCNC: 3.6 G/DL (ref 4–5)
ALP SERPL-CCNC: 77 U/L (ref 35–104)
ALT SERPL-CCNC: 18 U/L (ref ?–33)
ANION GAP SERPL CALC-SCNC: 12 MMOL/L (ref 7–16)
AST SERPL-CCNC: 27 U/L (ref 10–35)
BASOPHILS NFR BLD: 0.5 % (ref 0–6)
BILIRUB DIRECT SERPL-MCNC: 0.2 MG/DL (ref 0–0.3)
BILIRUB SERPL-MCNC: 0.9 MG/DL (ref 0.2–1)
BUN SERPL-MCNC: 13 MG/DL (ref 8–23)
CO2 SERPL-SCNC: 25 MMOL/L (ref 22–29)
CREAT SERPL-MCNC: 0.6 MG/DL (ref 0.5–0.9)
EOSINOPHIL NFR BLD: 1.1 % (ref 0–6)
ERYTHROCYTE [DISTWIDTH] IN BLOOD BY AUTOMATED COUNT: 13.3 % (ref 11.5–14.5)
EST GLOMERULAR FILTRATION RATE: > 60 ML/MIN
GLUCOSE SERPL-MCNC: 133 MG/DL (ref 74–109)
GRANULOCYTES NFR BLD: 75.4 % (ref 47–80)
HCT VFR BLD CALC: 40.7 % (ref 35–47)
HGB BLD-MCNC: 13.7 GM/DL (ref 11.6–16)
LIPASE SERPL-CCNC: 12 U/L (ref 13–60)
LYMPHOCYTES NFR BLD AUTO: 13.9 % (ref 16–45)
MCH RBC QN AUTO: 31.6 PG (ref 27–33)
MCHC RBC AUTO-ENTMCNC: 33.7 G/DL (ref 32–36)
MCV RBC AUTO: 94 FL (ref 81–97)
MONOCYTES NFR BLD: 9.1 % (ref 0–9)
PLATELET # BLD: 320 K/UL (ref 130–400)
PMV BLD AUTO: 9.4 FL (ref 7.4–10.4)
PROT SERPL-MCNC: 6.5 G/DL (ref 6.6–8.7)
RBC # BLD AUTO: 4.33 M/UL (ref 3.8–5.4)
WBC # BLD AUTO: 8.9 K/UL (ref 4.2–12.2)

## 2019-12-19 PROCEDURE — 83690 ASSAY OF LIPASE: CPT

## 2019-12-19 PROCEDURE — 99284 EMERGENCY DEPT VISIT MOD MDM: CPT

## 2019-12-19 PROCEDURE — 86140 C-REACTIVE PROTEIN: CPT

## 2019-12-19 PROCEDURE — 85025 COMPLETE CBC W/AUTO DIFF WBC: CPT

## 2019-12-19 PROCEDURE — 96374 THER/PROPH/DIAG INJ IV PUSH: CPT

## 2019-12-19 PROCEDURE — 80048 BASIC METABOLIC PNL TOTAL CA: CPT

## 2019-12-19 PROCEDURE — 80076 HEPATIC FUNCTION PANEL: CPT

## 2019-12-19 RX ADMIN — SUCRALFATE ONE G: 1 SUSPENSION ORAL at 14:49

## 2019-12-19 RX ADMIN — SODIUM CHLORIDE ONE ML: 0.9 INJECTION, SOLUTION INTRAVENOUS at 14:52

## 2019-12-19 RX ADMIN — POTASSIUM CHLORIDE ONE MEQ: 20 TABLET, EXTENDED RELEASE ORAL at 15:41

## 2019-12-19 RX ADMIN — ONDANSETRON ONE MG: 2 INJECTION INTRAMUSCULAR; INTRAVENOUS at 14:52

## 2019-12-19 NOTE — EMERGENCY DEPARTMENT RECORD
History of Present Illness





- General


Chief complaint: Weakness


Stated complaint: WEAK AND DEHYDRATED


Time Seen by Provider: 19 14:31


Source: Patient


Mode of Arrival: Ambulatory


Limitations: No limitations





- History of Present Illness


Initial comments: 


The patient is here due to generalized weakness for one day with loose watery 

stools. She does have some nausea off and on and did vomit a small amount 

yesterday but none today. There has been no HA, CP, SOB, REGINO, seating, AP, back 

pain or dysuria. The patient was in the hospital at [facility] 2 months ago for a week 

due to pneumonia and has not felt well since. 





MD Complaint: Generalized weakness, Lack of energy


Onset/Timin


-: Days(s)


Location: Generalized


Improves with: None


Worsens with: None


Associated Symptoms: Nausea/vomiting





- Related Data


                                    Allergies











Allergy/AdvReac Type Severity Reaction Status Date / Time


 


tramadol Allergy Severe ITCHING Verified 10/11/19 19:18


 


amoxicillin trihydrate Allergy Unknown PT UNSURE Verified 10/11/19 19:18





[From AMOXIL]   OF REACTION  


 


azithromycin [From ZITHROMAX] Allergy Unknown PT UNSURE Verified 10/11/19 19:18





   OF REACTION  


 


ciprofloxacin [From CIPRO] Allergy Unknown PT UNSURE Verified 10/11/19 19:18





   OF REACTION  


 


ciprofloxacin HCl Allergy Unknown PT UNSURE Verified 10/11/19 19:18





[From CIPRO]   OF REACTION  


 


clarithromycin Allergy Unknown PT UNSURE Verified 10/11/19 19:18





[CLARITHROMYCIN]   OF REACTION  


 


codeine [CODEINE] Allergy Unknown PT UNSURE Verified 10/11/19 19:18





   OF REACTION  


 


prednisone [PREDNISONE] Allergy Unknown PT Verified 10/11/19 19:18





   ALLERGIC  





   TO STEROIDS  


 


Sulfa (Sulfonamide Allergy Unknown ANAPHYLAXIS Verified 10/11/19 19:18





Antibiotics)     














Travel Screening





- Travel/Exposure Within Last 30 Days


Have you traveled within the last 30 days?: No





Review of Systems


Constitutional: Reports: Malaise.  Denies: Chills, Fever


Eyes: Denies: Eye discharge


ENT: Denies: Congestion


Respiratory: Denies: Cough, Dyspnea


Cardiovascular: Denies: Chest pain


Endocrine: Reports: Fatigue


Gastrointestinal: Reports: Diarrhea, Nausea


Genitourinary: Denies: Dysuria


Musculoskeletal: Denies: Arthralgia


Skin: Denies: Bruising





Past Medical History





- SOCIAL HISTORY


Smoking Status: Former smoker


Alcohol Use: None


Drug Use: None





- RESPIRATORY


Hx Respiratory Disorders: Yes


Hx Pneumonia: Yes





- CARDIOVASCULAR


Hx Cardio Disorders: Yes


Hx Heart Attack: Yes


Hx Hypertension: Yes





- NEURO


Hx Neuro Disorders: Yes


Hx CVA: Yes


Hx Headaches: Yes


Hx TIA: Yes





- GI


Hx GI Disorders: Yes


Hx Reflux: Yes (was on nexium)





- 


Hx Genitourinary Disorders: No





- ENDOCRINE


Hx Endocrine Disorders: No


Hx Diabetes: No


Hx Thyroid Disease: No





- MUSCULOSKELETAL


Hx Musculoskeletal Disorders: Yes


Hx Arthritis: Yes


Hx Osteoporosis: Yes





- PSYCH


Hx Psych Problems: No





- HEMATOLOGY/ONCOLOGY


Hx Hematology/Oncology Disorders: Yes


Hx Cancer: Yes (uterine)





Family Medical History


Any Significant Family History?: Yes


Hx Alcohol Use: Father


Hx Cancer: Brother/Sister


Hx Diabetes: Brother/Sister


Hx Heart Disease: Father, Brother/Sister


Hx Liver Disease: Father


Hx Resp Disorders: Father, Mother, Brother/Sister





Physical Exam





- General


General Appearance: Alert, Oriented x3, Cooperative, No acute distress





- Head


Head exam: Atraumatic, Normocephalic, Normal inspection





- Eye


Eye exam: Normal appearance, PERRL





- ENT


Throat exam: Normal inspection.  negative: Tonsillar erythema, Tonsillar exudate





- Neck


Neck exam: Normal inspection, Full ROM.  negative: Tenderness





- Respiratory


Respiratory exam: Normal lung sounds bilaterally.  negative: Respiratory 

distress





- Cardiovascular


Cardiovascular Exam: Regular rate, Normal rhythm, Normal heart sounds





- GI/Abdominal


GI/Abdominal exam: Soft, Normal bowel sounds.  negative: Rebound, Rigid, 

Tenderness





- Extremities


Extremities exam: Normal inspection, Full ROM, Normal capillary refill.  

negative: Tenderness





- Neurological


Neurological exam: Alert, Normal gait.  negative: Abnormal gait, Motor sensory 

deficit





- Psychiatric


Psychiatric exam: negative: Anxious





Course





                                   Vital Signs











  19





  14:26


 


Temperature 97.4 F L


 


Pulse Rate 68


 


Respiratory 18





Rate 


 


Blood Pressure 110/57


 


Pulse Ox 93 L














- Reevaluation(s)


Reevaluation #1: 


The patient is doing a lot better at this time. She is drinking water without 

difficulty. I did discuss the lab results with the patient and the need for 

follow up with her PCP next week.


19 15:51








Medical Decision Making





- Data Complexity


MDM Data: Labs Ordered and/or Reviewed





- Lab Data


Result diagrams: 


                                 19 14:50





                                 19 14:50





Disposition


Disposition: Discharge


Clinical Impression: 


 Weakness





Disposition: Home, Self-Care


Condition: (2) Stable


Instructions:  Weakness (ED)


Additional Instructions: 


Please drink plenty of fluids and see your doctor for recheck next week. Return 

to the ER for any worsening symptoms.


Forms:  Patient Portal Access


Time of Disposition: 15:52





Quality





- Quality Measures


Quality Measures: Blunt Head Trauma (>2yr)





- Blunt Head Trauma - Adult


Quality Measure: Measure #415: Utilization of CT for Minor Blunt Head Trauma


ICD10 Codes Entered: Yes


View Details: Yes


Was CT ordered: No


Ismay Score: Please complete Ismay Coma Scale above


Utilization of CT for Minor Blunt Head Trauma: Not Eligible For Measure


Additional Inclusion Criteria: More than 24hrs (OR) GCS not 15 (OR) CT not 

ordered.


Not Eligible Reason: CT Not Ordered





- Blood Pressure Screening


View Details: Yes


Does Patient Have Any of the Following: No


Blood Pressure Classification: Normal BP Reading


Systolic Measurement: 110


Diastolic Measurement: 57


Screening for High Blood Pressure: < Normal BP, F/U Not Required > []